# Patient Record
Sex: MALE | Race: WHITE | NOT HISPANIC OR LATINO | Employment: FULL TIME | URBAN - METROPOLITAN AREA
[De-identification: names, ages, dates, MRNs, and addresses within clinical notes are randomized per-mention and may not be internally consistent; named-entity substitution may affect disease eponyms.]

---

## 2017-04-04 ENCOUNTER — PATIENT MESSAGE (OUTPATIENT)
Dept: OTOLARYNGOLOGY | Facility: CLINIC | Age: 22
End: 2017-04-04

## 2017-04-04 ENCOUNTER — TELEPHONE (OUTPATIENT)
Dept: OTOLARYNGOLOGY | Facility: CLINIC | Age: 22
End: 2017-04-04

## 2017-04-04 NOTE — TELEPHONE ENCOUNTER
----- Message from Sparkle Koo sent at 4/4/2017  2:49 PM CDT -----  Contact: my chart  Appointment Request From: Dayne Sanders         With Provider: Other - (see comments) [oth]        Would Accept With:Only the person I've selected        Preferred Date Range: From 4/2/2017 To 4/30/2017        Preferred Times: Any        Reason for visit: Request an Appt        Comments:    Dr. Eros Bauman

## 2017-04-19 ENCOUNTER — OFFICE VISIT (OUTPATIENT)
Dept: OTOLARYNGOLOGY | Facility: CLINIC | Age: 22
End: 2017-04-19
Payer: COMMERCIAL

## 2017-04-19 VITALS
BODY MASS INDEX: 24.38 KG/M2 | HEART RATE: 77 BPM | TEMPERATURE: 98 F | SYSTOLIC BLOOD PRESSURE: 151 MMHG | WEIGHT: 155.63 LBS | DIASTOLIC BLOOD PRESSURE: 70 MMHG

## 2017-04-19 DIAGNOSIS — J34.89 NASAL OBSTRUCTION: ICD-10-CM

## 2017-04-19 DIAGNOSIS — G47.30 SLEEP DISORDER BREATHING: ICD-10-CM

## 2017-04-19 DIAGNOSIS — J35.1 CHRONIC TONSILLAR HYPERTROPHY: Primary | ICD-10-CM

## 2017-04-19 PROCEDURE — 99203 OFFICE O/P NEW LOW 30 MIN: CPT | Mod: 25,S$GLB,, | Performed by: OTOLARYNGOLOGY

## 2017-04-19 PROCEDURE — 31575 DIAGNOSTIC LARYNGOSCOPY: CPT | Mod: S$GLB,,, | Performed by: OTOLARYNGOLOGY

## 2017-04-19 PROCEDURE — 1160F RVW MEDS BY RX/DR IN RCRD: CPT | Mod: S$GLB,,, | Performed by: OTOLARYNGOLOGY

## 2017-04-19 PROCEDURE — 99999 PR PBB SHADOW E&M-EST. PATIENT-LVL III: CPT | Mod: PBBFAC,,, | Performed by: OTOLARYNGOLOGY

## 2017-04-19 NOTE — MR AVS SNAPSHOT
[facility]  [address]  [facility] [address]  Phone: [phone]  Fax: [phone]                  Dayne Sanders   2017 4:00 PM   Office Visit    Description:  Male : 1995   Provider:  Eros Bauman MD   Department:  [facility]           Reason for Visit     unable to breath thru nose/ tonsils swollen           Diagnoses this Visit        Comments    Chronic tonsillar hypertrophy    -  Primary     Nasal obstruction         Sleep disorder breathing                To Do List           Goals (5 Years of Data)     None      Follow-Up and Disposition     Return in about 3 months (around 2017).      [facility] On Call     [facility] On Call Nurse Care Line -  Assistance  Unless otherwise directed by your provider, please contact [facility] On-Call, our nurse care line that is available for  assistance.     Registered nurses in the [facility] On Call Center provide: appointment scheduling, clinical advisement, health education, and other advisory services.  Call: [phone] (toll free)               Medications           Message regarding Medications     Verify the changes and/or additions to your medication regime listed below are the same as discussed with your clinician today.  If any of these changes or additions are incorrect, please notify your healthcare provider.             Verify that the below list of medications is an accurate representation of the medications you are currently taking.  If none reported, the list may be blank. If incorrect, please contact your healthcare provider. Carry this list with you in case of emergency.           Current Medications     azithromycin (ZITHROMAX) 500 MG tablet Take 2 tabs today and 1 tab daily for the next 4 days    ibuprofen (ADVIL,MOTRIN) 200 MG tablet Take 200 mg by mouth every 6 (six) hours as needed for Pain (2 tabs).    LORATADINE/PSEUDOEPHEDRINE (CLARITIN-D 24 HOUR  ORAL) Take 1 capsule by mouth once daily.           Clinical Reference Information           Your Vitals Were     BP Pulse Temp Weight BMI    151/70 77 97.9 °F (36.6 °C) 70.6 kg (155 lb 10.3 oz) 24.38 kg/m2      Blood Pressure          Most Recent Value    BP  (!)  151/70      Allergies as of 4/19/2017     No Known Allergies      Immunizations Administered on Date of Encounter - 4/19/2017     None      Instructions    Use Flonase nasal spray  Use nasal saline spray  Use Allegra as needed  Consider tonsillectomy +/- adenoidectomy      Adult Tonsillectomy  The tonsils are 2 small masses of tissue at the back of the throat. They are part of the bodys immune system. This helps the body fight disease. In some people, the tonsils become infected or enlarged. This can cause severe sore throats, snoring, or other problems. Tonsillectomy is surgery to remove the tonsils. Tonsillectomy may be recommended if you have obstruction causing sleep apnea, or recurring, chronic, or severe infections. This sheet tells you more about this surgery and what to expect.    Preparing for surgery  Prepare as you have been told. Tell your healthcare provider about all medicine you take. This includes over-the-counter drugs. It also includes herbs and other supplements. You may need to stop taking some or all of them before surgery as directed by your healthcare provider. Also, follow any directions youre given for not eating or drinking before surgery.  The day of surgery  The surgery takes about 60 minutes. You will likely go home on the same day.  Before the surgery  Here is what to expect before the surgery begins:  · An IV line is put into a vein in your arm or hand. This line supplies fluids and medicines.  · To keep you free of pain during the surgery, youre given general anesthesia. This medicine puts you into a state like deep sleep through the surgery.  During the surgery  Here is what to expect during the surgery:  · A special  device is used to keep the mouth open.  · Other tools are used to remove the tonsils from the back of the throat. The tissue is taken out through the mouth.  · The device holding the mouth open is then removed.  After the surgery  You will be taken to a recovery room. Healthcare staff will make sure you can drink some liquids. They will also make sure your pain is being managed. When you are ready to leave the hospital, you will need to be driven home by an adult family member or friend.  Recovering at home  It will likely take about 2 weeks to heal from the surgery. During your recovery:  · Expect to have throat pain. You may also feel pain in your ears. This is referred pain from the throat, and is normal. Your post-surgery pain may come and go. It may be worse on the 4th or 5th day after surgery.  · Talk as little as possible, if it is painful.  · Take pain medicine as directed.  · Do not drive while you are on opioid or narcotic pain medicine. Expect to feel sleepy or dizzy while you are taking this medicine.  · Do not use ibuprofen or aspirin for 14 days after surgery unless your healthcare provider says its OK. You may use acetaminophen as directed.  · Use 2 or 3 pillows under your head while resting. This will help keep swelling down.  · Drink lots of chilled liquids. Water, noncitrus juices, and frozen juice bars are good choices.  · Eat cold foods and soft foods, which are easiest to swallow. Try foods like ice cream, gelatin, scrambled eggs, pasta, and mashed potatoes.  · Avoid foods that require a lot of chewing. Also avoid foods that may scratch the throat, like toast or potato chips. Avoid hot, spicy, or acidic foods.  · Avoid strenuous activity for 2 to 3 weeks after surgery.  · Be aware that white patches will form in the throat during healing. These are scabs and are not a sign of infection. The patches will come off in 1 to 2 weeks and may cause bleeding. To minimize bleeding, drink lots of  fluids. Gargling with cold water can help.  Call the healthcare provider  Call your healthcare provider if you have any of the following:  · Chest pain or trouble breathing (call 911)  · Fever of 100.4°F (38°C) or higher, or as directed by your healthcare provider  · Bright red bleeding from the mouth or nose  · Severe pain not relieved by medicine  · Signs of dehydration (dark urine, urinating less often)  · Heavy or persistent bleeding in the throat at any time  · Other signs or symptoms as indicated by your healthcare provider   Follow-up  Schedule a follow-up visit with your healthcare provider as advised. During this visit, the healthcare provider will make sure you are healing well. Ask any questions you have about the surgery or your recovery.  Risks and possible complications   Risks of this surgery include:  · Infection  · Bleeding  · Injury to the lips or teeth  · Painful swallowing during recovery  · The need for a second surgery  · Risks of anesthesia    Date Last Reviewed: 6/11/2015  © 0166-8719 [employer]. [address] [place] [address]. All rights reserved. This information is not intended as a substitute for professional medical care. Always follow your healthcare professional's instructions.        Tonsillectomy/Adenoidectomy  Your child may be having surgery to remove the tonsils or adenoids. If required, the tonsils and adenoids can be removed during the same surgery. The 2 procedures are described below.    Tonsillectomy  Tonsillectomy is surgery to remove the tonsils. The tonsils are two small masses of tissue that help the body fight disease. They are located in the back of the throat, behind and above the tongue. The tonsils are easily seen. Tonsillectomy may be performed if enlarged tonsils make it hard to breathe, or if the tonsils are often infected.  Adenoidectomy  Adenoidectomy is surgery to remove the adenoids. The word adenoids refers to a single mass of  tissue that helps the body fight disease. This mass is located behind the nose and upper throat, near the passage to the middle ear (eustachian tube). It is hidden from view by the soft palate. Adenoidectomy may be needed if enlarged adenoid tissue obstructs breathing. It may also be done if infected adenoid tissue is causing ear infections.  Removal of the tonsils and adenoids is one of the most common surgical procedures. Although the tonsils and adenoids help to fight infections, the body's ability to fight infection is not negatively affected when the tonsils and adenoids are removed.  Date Last Reviewed: 9/8/2014 © 2000-2016 [employer]. [address] [place] [address], [place] [address]. All rights reserved. This information is not intended as a substitute for professional medical care. Always follow your healthcare professional's instructions.        After Tonsillectomy/Adenoidectomy     Drinking plenty of fluids will help your child recover.   Your child has had surgery to remove tonsils and/or adenoids. Your child will need time to get better. Below are guidelines for your childs recovery.  Pain and Activity  · Expect your child to have some throat or ear pain for 1-2 weeks.  · Limit activity for 1-2 weeks or as advised.  Diet  Make sure your child gets enough fluids and nutrients. Food and drink guidelines include:  · Give lots of fluids. Good choices are water, popsicles, and mild juices. (Do not give citrus juice or other acidic juices.)  · Give soft foods to eat. These include gelatin, pudding, ice cream, scrambled eggs, pasta, and mashed foods.  · Do not give spicy, acidic, or rough foods. These include fresh fruits, toast, crackers, and potato chips.  Medication  Give only medications approved by your childs health care provider. Follow directions closely when giving your child medications.  · Your child may be prescribed pain medication.  · Do not give your child ibuprofen or aspirin. They  may cause bleeding. If needed for discomfort, you can give your child acetaminophen instead.  When to Call Your Child's Health Care Provider  Mild pain and a slight fever are normal after surgery. The surgical site will turn whitish while it is healing. This is normal and not an infection. But call your child's health care provider right away if your otherwise healthy child has any of the following:  · Persistent fever:  ¨ In a child 3 to 36 months, a rectal temperature of 102°F (39.0°C) or higher  ¨ In a child of any age who has a temperature of 103°F (39.4°C) or higher  ¨ A fever that lasts more than 24-hours in a child under 2 years old, or for 3 days in a child 2 years or older  ¨ Your child has had a seizure caused by the fever  · Severe pain not relieved by medication  · Bright red bleeding, which includes fast bleeding, spitting, or coughing up a large clot, or blood-tinged spit that continues  · Trouble breathing   Date Last Reviewed: 9/8/2014  © 6564-8799 [employer]. [address] [place] [address]. All rights reserved. This information is not intended as a substitute for professional medical care. Always follow your healthcare professional's instructions.             Language Assistance Services     ATTENTION: Language assistance services are available, free of charge. Please call [phone].      ATENCIÓN: Si habla mik, tiene a jay disposición servicios gratuitos de asistencia lingüística. Llame al [phone].     GLORIA Ý: N?u b?n nói Ti?ng Vi?t, có các d?ch v? h? tr? ngôn ng? mi?n phí dành cho b?n. G?i s? [phone].         [facility] complies with applicable Federal civil rights laws and does not discriminate on the basis of race, color, national origin, age, disability, or sex.

## 2017-04-19 NOTE — PROGRESS NOTES
"[facility]  Department of Otorhinolaryngology and Communication Sciences    Dayne Sanders is a 21 y.o. male who presents to the [facility] self-referred for further management of difficulty breathing through his nose and frequent strep throat.     He complains of constant discomfort in his throat. Onset was gradual. Duration is at least 8 years. Time course is constant.  Last time he had strep was about a year ago.  Symptoms are worsening. He denies any exacerbating factors. He denies any alleviating factors. Associated symptoms include needing to clear his throat when he is speaking.  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 He has been "sick" 3-4 times within the last year. Symptoms include a mild headache, worsening of his sore throat, clear rhinorrhea, fatigue, photophobia. He usually seeks care from an urgent care. He receives antibiotics, which usually do begin helping within the first few days. He has not required hospitalization or ED visits, nor has he had any oropharyngeal or cervical abscesses. He has a history of Mono 5 years ago. He reports regular loud snoring per his girlfriend, who also does endorse witnessed apneas on a fairly consistent basis, in addition to nighttime mouth breathing. He reports constant postnasal drip which can be difficult to manage while singing. In the past he has not found any relief with OTC remedies. He has used Nasonex in the past, without any significant benefit. Claritin D. He reports anosmia and mild hypogeusia. He denies any other atopic symptoms. However, he notes exacerbation of his sinonasal symptoms in the winter and spring. He reports that he had asthma as a child, but he does not suspect he has asthma anymore. He does not have any known NSAID sensitivity.    He is currently working in real estate.  He plays several instruments, including guitar. 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       Past Medical History  He has a past medical history of Mononucleosis.     Past Surgical History  He has a past surgical history that includes mole removeal (2002).    Family History  His family history includes Diabetes in his maternal grandmother; Heart disease in his brother and paternal grandfather; Hypertension in his paternal grandfather; Macular degeneration in his paternal grandfather; No Known Problems in his father and mother.    Social History  He reports that he quit smoking about 2 years ago. His smoking use included Cigarettes. He does not have any smokeless tobacco history on file. He reports that he does not use illicit drugs.    Allergies  He has No Known Allergies.    Medications  He has a current medication list which includes the following prescription(s): azithromycin, ibuprofen, and loratadine/pseudoephedrine.    Review of Systems   Constitutional: Negative for fever.   HENT: Negative for sore throat.    Eyes: Negative for visual disturbance.   Respiratory: Negative for wheezing.    Cardiovascular: Negative for chest pain.   Gastrointestinal: Negative for nausea.   Musculoskeletal: Negative for arthralgias.   Skin: Negative for rash.   Neurological: Negative for tremors.   Hematological: Does not bruise/bleed easily.   Psychiatric/Behavioral: The patient is not nervous/anxious.           Objective:     BP (!) 151/70  Pulse 77  Temp 97.9 °F (36.6 °C)  Wt 70.6 kg (155 lb 10.3 oz)  BMI 24.38 kg/m2   Physical Exam    Constitutional: comfortable, well dressed  Psychiatric: appropriate affect  Respiratory: comfortably breathing, symmetric chest rise, no stridor  Voice: normal for age and gender  Cardiovascular: upper extremities non-edematous  Lymphatic: no cervical lymphadenopathy  Neurologic: alert and oriented to time, place, person, and situation; cranial nerves 3-12 grossly intact  Head: normocephalic  Eyes: conjunctivae and sclerae clear  Ears: normal pinnae, normal external  auditory canals, tympanic membranes intact  Nose: mucosa pink;, no masses, no mucopurulence, no polyps  Oral cavity / oropharynx: no mucosal lesions; 3+ tonsils, no exudates  Neck: soft, full range of motion, laryngotracheal complex palpable with appropriate landmarks, larynx elevates on swallowing  Indirect laryngoscopy: limited due to gag    Procedure  Flexible Laryngoscopy (07456): Laryngoscopy is indicated for assessment of upper aerodigestive structure and function. This was carried out transnasally with a distal chip videoendoscope. After verbal consent was obtained, the patient was positioned and the nose was topically decongested with 1% phenylephrine and topically anesthetized with 4% lidocaine. The endoscope was passed through the most patent nasal cavity and positioned to image the nasopharynx, larynx, and hypopharynx in detail. The following featured were examined: nasopharyngeal, laryngeal, hypopharyngeal masses; velopharyngeal strength, closure, and symmetry of motion; vocal fold range and symmetry of motion; laryngeal mucosal edema, erythema, inflammation, and hydration; salivary pooling; and gross laryngeal sensation. The equipment was removed. The patient tolerated the procedure well without complication. All findings were normal except:  - mild adenoid hypertrophy  - mild turbinate hypertrophy  - mild diffuse posterior pharyngeal cobblestoning      Assessment:     Dayne Sanders is a 21 y.o. male with chronic tonsillar hypertrophy, chronic sore throats, recurrent acute pharyngotonsillitis, obstructive sleep disordered breathing, nasal obstruction, inferior nasal turbinate hypertrophy       Plan:        I had a discussion with the patient regarding his condition and the further workup and management options.      He does meet indications for tonsillectomy. I discussed the risks, benefits and alternatives to surgery with the patient, as well as the expected postoperative course. Risks included  but were not limited to pain; bleeding; infection; scarring; voice/speech change; swallowing disturbance; nasal regurgitation; recurrence; need for additional and/or more extensive procedures; continued sore throats; continued halitosis; oral/dental problems (pain, laceration, broken or missing teeth); jaw joint problems (pain, dislocation); and tongue problems (pain, laceration, numbness, weakness, taste disturbance). Also inherent in the procedure are the risks of general anesthesia, including but not limited to cardiovascular complications (heart attack, arrhythmia); pulmonary (respiratory failure); neurologic (stroke); and death.     He ultimately may also benefit from evaluation by allergy/immunology and/or Dr. Sahu.    At this time, he defers on any surgical intervention. I recommended regular use of nasal saline spray and a topical nasal steroid, and use of fexofenadine PRN.    He will follow up with me in 3 month(s), or sooner if needed.    All questions were answered, and the patient is in agreement with the above.     Eros Bauman M.D.  [facility]  Department of Otorhinolaryngology and Communication Sciences

## 2017-04-19 NOTE — PATIENT INSTRUCTIONS
Use Flonase nasal spray  Use nasal saline spray  Use Allegra as needed  Consider tonsillectomy +/- adenoidectomy      Adult Tonsillectomy  The tonsils are 2 small masses of tissue at the back of the throat. They are part of the bodys immune system. This helps the body fight disease. In some people, the tonsils become infected or enlarged. This can cause severe sore throats, snoring, or other problems. Tonsillectomy is surgery to remove the tonsils. Tonsillectomy may be recommended if you have obstruction causing sleep apnea, or recurring, chronic, or severe infections. This sheet tells you more about this surgery and what to expect.    Preparing for surgery  Prepare as you have been told. Tell your healthcare provider about all medicine you take. This includes over-the-counter drugs. It also includes herbs and other supplements. You may need to stop taking some or all of them before surgery as directed by your healthcare provider. Also, follow any directions youre given for not eating or drinking before surgery.  The day of surgery  The surgery takes about 60 minutes. You will likely go home on the same day.  Before the surgery  Here is what to expect before the surgery begins:  · An IV line is put into a vein in your arm or hand. This line supplies fluids and medicines.  · To keep you free of pain during the surgery, youre given general anesthesia. This medicine puts you into a state like deep sleep through the surgery.  During the surgery  Here is what to expect during the surgery:  · A special device is used to keep the mouth open.  · Other tools are used to remove the tonsils from the back of the throat. The tissue is taken out through the mouth.  · The device holding the mouth open is then removed.  After the surgery  You will be taken to a recovery room. Healthcare staff will make sure you can drink some liquids. They will also make sure your pain is being managed. When you are ready to leave the hospital,  you will need to be driven home by an adult family member or friend.  Recovering at home  It will likely take about 2 weeks to heal from the surgery. During your recovery:  · Expect to have throat pain. You may also feel pain in your ears. This is referred pain from the throat, and is normal. Your post-surgery pain may come and go. It may be worse on the 4th or 5th day after surgery.  · Talk as little as possible, if it is painful.  · Take pain medicine as directed.  · Do not drive while you are on opioid or narcotic pain medicine. Expect to feel sleepy or dizzy while you are taking this medicine.  · Do not use ibuprofen or aspirin for 14 days after surgery unless your healthcare provider says its OK. You may use acetaminophen as directed.  · Use 2 or 3 pillows under your head while resting. This will help keep swelling down.  · Drink lots of chilled liquids. Water, noncitrus juices, and frozen juice bars are good choices.  · Eat cold foods and soft foods, which are easiest to swallow. Try foods like ice cream, gelatin, scrambled eggs, pasta, and mashed potatoes.  · Avoid foods that require a lot of chewing. Also avoid foods that may scratch the throat, like toast or potato chips. Avoid hot, spicy, or acidic foods.  · Avoid strenuous activity for 2 to 3 weeks after surgery.  · Be aware that white patches will form in the throat during healing. These are scabs and are not a sign of infection. The patches will come off in 1 to 2 weeks and may cause bleeding. To minimize bleeding, drink lots of fluids. Gargling with cold water can help.  Call the healthcare provider  Call your healthcare provider if you have any of the following:  · Chest pain or trouble breathing (call 911)  · Fever of 100.4°F (38°C) or higher, or as directed by your healthcare provider  · Bright red bleeding from the mouth or nose  · Severe pain not relieved by medicine  · Signs of dehydration (dark urine, urinating less often)  · Heavy or  persistent bleeding in the throat at any time  · Other signs or symptoms as indicated by your healthcare provider   Follow-up  Schedule a follow-up visit with your healthcare provider as advised. During this visit, the healthcare provider will make sure you are healing well. Ask any questions you have about the surgery or your recovery.  Risks and possible complications   Risks of this surgery include:  · Infection  · Bleeding  · Injury to the lips or teeth  · Painful swallowing during recovery  · The need for a second surgery  · Risks of anesthesia    Date Last Reviewed: 6/11/2015 © 2000-2016 [employer]. [address] [place] [address]. All rights reserved. This information is not intended as a substitute for professional medical care. Always follow your healthcare professional's instructions.        Tonsillectomy/Adenoidectomy  Your child may be having surgery to remove the tonsils or adenoids. If required, the tonsils and adenoids can be removed during the same surgery. The 2 procedures are described below.    Tonsillectomy  Tonsillectomy is surgery to remove the tonsils. The tonsils are two small masses of tissue that help the body fight disease. They are located in the back of the throat, behind and above the tongue. The tonsils are easily seen. Tonsillectomy may be performed if enlarged tonsils make it hard to breathe, or if the tonsils are often infected.  Adenoidectomy  Adenoidectomy is surgery to remove the adenoids. The word adenoids refers to a single mass of tissue that helps the body fight disease. This mass is located behind the nose and upper throat, near the passage to the middle ear (eustachian tube). It is hidden from view by the soft palate. Adenoidectomy may be needed if enlarged adenoid tissue obstructs breathing. It may also be done if infected adenoid tissue is causing ear infections.  Removal of the tonsils and adenoids is one of the most common surgical  procedures. Although the tonsils and adenoids help to fight infections, the body's ability to fight infection is not negatively affected when the tonsils and adenoids are removed.  Date Last Reviewed: 9/8/2014 © 2000-2016 [employer]. [address] [place] [address], [place] [address]. All rights reserved. This information is not intended as a substitute for professional medical care. Always follow your healthcare professional's instructions.        After Tonsillectomy/Adenoidectomy     Drinking plenty of fluids will help your child recover.   Your child has had surgery to remove tonsils and/or adenoids. Your child will need time to get better. Below are guidelines for your childs recovery.  Pain and Activity  · Expect your child to have some throat or ear pain for 1-2 weeks.  · Limit activity for 1-2 weeks or as advised.  Diet  Make sure your child gets enough fluids and nutrients. Food and drink guidelines include:  · Give lots of fluids. Good choices are water, popsicles, and mild juices. (Do not give citrus juice or other acidic juices.)  · Give soft foods to eat. These include gelatin, pudding, ice cream, scrambled eggs, pasta, and mashed foods.  · Do not give spicy, acidic, or rough foods. These include fresh fruits, toast, crackers, and potato chips.  Medication  Give only medications approved by your childs health care provider. Follow directions closely when giving your child medications.  · Your child may be prescribed pain medication.  · Do not give your child ibuprofen or aspirin. They may cause bleeding. If needed for discomfort, you can give your child acetaminophen instead.  When to Call Your Child's Health Care Provider  Mild pain and a slight fever are normal after surgery. The surgical site will turn whitish while it is healing. This is normal and not an infection. But call your child's health care provider right away if your otherwise healthy child has any of the following:  · Persistent  fever:  ¨ In a child 3 to 36 months, a rectal temperature of 102°F (39.0°C) or higher  ¨ In a child of any age who has a temperature of 103°F (39.4°C) or higher  ¨ A fever that lasts more than 24-hours in a child under 2 years old, or for 3 days in a child 2 years or older  ¨ Your child has had a seizure caused by the fever  · Severe pain not relieved by medication  · Bright red bleeding, which includes fast bleeding, spitting, or coughing up a large clot, or blood-tinged spit that continues  · Trouble breathing   Date Last Reviewed: 9/8/2014  © 1488-7404 [employer]. [address] [place] [address], [place] [address]. All rights reserved. This information is not intended as a substitute for professional medical care. Always follow your healthcare professional's instructions.

## 2017-08-17 ENCOUNTER — OFFICE VISIT (OUTPATIENT)
Dept: URGENT CARE | Facility: CLINIC | Age: 22
End: 2017-08-17
Payer: COMMERCIAL

## 2017-08-17 VITALS
OXYGEN SATURATION: 98 % | WEIGHT: 160 LBS | HEART RATE: 81 BPM | DIASTOLIC BLOOD PRESSURE: 78 MMHG | BODY MASS INDEX: 22.9 KG/M2 | TEMPERATURE: 99 F | SYSTOLIC BLOOD PRESSURE: 134 MMHG | HEIGHT: 70 IN | RESPIRATION RATE: 18 BRPM

## 2017-08-17 DIAGNOSIS — K52.9 GASTROENTERITIS: Primary | ICD-10-CM

## 2017-08-17 PROCEDURE — 99203 OFFICE O/P NEW LOW 30 MIN: CPT | Mod: S$GLB,,, | Performed by: NURSE PRACTITIONER

## 2017-08-17 PROCEDURE — 3008F BODY MASS INDEX DOCD: CPT | Mod: S$GLB,,, | Performed by: NURSE PRACTITIONER

## 2017-08-17 RX ORDER — FLUTICASONE PROPIONATE 50 MCG
1 SPRAY, SUSPENSION (ML) NASAL DAILY
COMMUNITY
End: 2018-07-20

## 2017-08-17 RX ORDER — ONDANSETRON 4 MG/1
TABLET, ORALLY DISINTEGRATING ORAL
Qty: 10 TABLET | Refills: 0 | Status: SHIPPED | OUTPATIENT
Start: 2017-08-17 | End: 2018-07-20

## 2017-08-17 NOTE — PATIENT INSTRUCTIONS
Noninfectious Gastroenteritis (Ages 6 Years to Adult)    Gastroenteritis can cause nausea, vomiting, diarrhea, and abdominal cramping. This may occur as a result of food sensitivity, inflammation of your gastrointestinal tract, medicines, stress, or other causes not related to infection. Your symptoms will usually last from 1 to 3 days, but can last longer. Antibiotics are not effective, but simple home treatment will be helpful.  Home care  Medicine  · You may use acetaminophen or NSAID medicines like ibuprofen or naproxen to control fever, unless another medicine is prescribed. (Note: If you have chronic liver or kidney disease, or ever had a stomach ulcer or gastrointestinalI bleeding, talk with your healthcare provider before using these medicines.) Aspirin should never be used in anyone under 18 years of age who is ill with a fever. It may cause severe liver damage. Don't increase your NSAID medicines if you are already taking these medicines for another condition (like arthritis). Don't use NSAIDS if you are on aspirin (such as for heart disease, or after a stroke).  · If medicines for diarrhea or vomiting are prescribed, take only as directed.  General care and preventing spread of the illness  · If symptoms are severe, rest at home for the next 24 hours or until you feel better.  · Hand washing with soap and water is the best way to prevent the spread of infection. Wash your hands after touching anyone who is sick.  · Wash your hands after using the toilet and before meals. Clean the toilet after each use.  · Caffeine, tobacco, and alcohol can make your diarrhea, cramping, and pain worse.  Diet  · Water and clear liquids are important so you do not get dehydrated. Drink a small amount at a time.  · Do not force yourself to eat, especially if you have cramps, vomiting, or diarrhea. When you finally decide to start eating, do not eat large amounts at a time, even if you are hungry.  · If you eat, avoid  fatty, greasy, spicy, or fried foods.  · Do not eat dairy products if you have diarrhea; they can make the diarrhea worse.  During the first 24 hours (the first full day), follow the diet below:  · Beverages: Water, clear liquids, soft drinks without caffeine, like ginger ale; mineral water (plain or flavored); decaffeinated tea and coffee.  · Soups: Clear broth, consommé, and bouillon Sports drinks aren't a good choice because they have too much sugar and not enough electrolytes. In this case, commercially available products called oral rehydration solutions are best.  · Desserts: Plain gelatin, popsicles, and fruit juice bars.  During the next 24 hours (the second day), you may add the following to the above if you have improved. If not, continue what you did the first day:  · Hot cereal, plain toast, bread, rolls, crackers  · Plain noodles, rice, mashed potatoes, chicken noodle or rice soup  · Unsweetened canned fruit (avoid pineapple), bananas  · Limit caffeine and chocolate. No spices or seasonings except salt.  During the next 24 hours  · Gradually resume a normal diet, as you feel better and your symptoms improve.  · If at any time your symptoms start getting worse, go back to clear liquids until you feel better.  Food preparation  · If you have diarrhea, you should not prepare food for others. When you  prepare food for yourself, wash your hands before and after.  · Wash your hands after using cutting boards, countertops, and knives that have been in contact with raw food.  · Keep uncooked meats away from cooked and ready-to-eat foods.  Follow-up care  Follow up with your healthcare provider if you are not improving over the next 2 to 3 days, or as advised. If a stool (diarrhea) sample was taken, call for the results as directed.  When to seek medical care  Call your healthcare provider right away if any of these occur:   · Increasing abdominal pain or constant lower right abdominal pain  · Continued  vomiting (unable to keep liquids down)  · Frequent diarrhea (more than 5 times a day)  · Blood in vomit or stool (black or red color)  · Inability to tolerate solid food after a few days.  · Dark urine, reduced urine output  · Weakness, dizziness  · Drowsiness  · Fever of 100.4ºF (38.0ºC) or higher, or as directed by your healthcare provider  · New rash  Call 911  Call 911 if any of these occur:  · Trouble breathing  · Chest pain  · Confusion  · Severe drowsiness or trouble awakening  · Seizure  · Stiff neck  Date Last Reviewed: 11/16/2015  © 5545-8853 [employer]. [address] [place] [address], [place] [address]. All rights reserved. This information is not intended as a substitute for professional medical care. Always follow your healthcare professional's instructions.

## 2017-08-17 NOTE — PROGRESS NOTES
"Subjective:       Patient ID: Dayne Sanders is a 22 y.o. male.    Vitals:  height is 5' 10" (1.778 m) and weight is 72.6 kg (160 lb). His temperature is 98.5 °F (36.9 °C). His blood pressure is 134/78 and his pulse is 81. His respiration is 18 and oxygen saturation is 98%.     Chief Complaint: Diarrhea    C/o N/V x1 and diarrhea watery 5-10 yesterday with only 2 episodes of watery diarrhea today.  Immodium taken with some relief.  No fever, chills, body aches.  No abdominal pain.  No one else ill.  Has not eaten anything strange or different from his girlfriend.  No mucus to stool.  No recent travel or antibiotic use.  Feels almost better but needs note for work.      Diarrhea    This is a new problem. The current episode started yesterday. The problem occurs 5 to 10 times per day. The problem has been rapidly improving. The stool consistency is described as watery. The patient states that diarrhea does not awaken him from sleep. Associated symptoms include vomiting. Pertinent negatives include no abdominal pain, bloating, chills, fever, URI or weight loss. Nothing aggravates the symptoms. He has tried anti-motility drug for the symptoms. The treatment provided mild relief. There is no history of bowel resection, inflammatory bowel disease, irritable bowel syndrome, malabsorption, a recent abdominal surgery or short gut syndrome.     Review of Systems   Constitution: Negative for chills, fever, weakness, malaise/fatigue and weight loss.   Cardiovascular: Negative for chest pain.   Respiratory: Negative for shortness of breath.    Skin: Negative for rash.   Musculoskeletal: Negative for back pain.   Gastrointestinal: Positive for diarrhea (only 2 episodes of watery diarrhea today.), nausea and vomiting. Negative for bloating, abdominal pain, constipation, hematemesis, hematochezia and melena.   Genitourinary: Negative for dysuria.   Allergic/Immunologic: Negative for persistent infections.       Objective:    "   Physical Exam   Constitutional: He is oriented to person, place, and time. He appears well-developed and well-nourished.  Non-toxic appearance. He does not have a sickly appearance. He does not appear ill. No distress.   HENT:   Head: Normocephalic and atraumatic.   Right Ear: External ear normal.   Left Ear: External ear normal.   Nose: Nose normal.   Mouth/Throat: Uvula is midline, oropharynx is clear and moist and mucous membranes are normal.   Eyes: Conjunctivae and lids are normal.   Neck: Trachea normal and full passive range of motion without pain. Neck supple.   Cardiovascular: Normal rate, regular rhythm and normal heart sounds.    Pulmonary/Chest: Effort normal and breath sounds normal. No respiratory distress.   Abdominal: Soft. Normal appearance. He exhibits no distension, no abdominal bruit, no pulsatile midline mass and no mass. Bowel sounds are increased. There is no tenderness.   Musculoskeletal: Normal range of motion. He exhibits no edema.   Neurological: He is alert and oriented to person, place, and time. He has normal strength.   Skin: Skin is warm, dry and intact. He is not diaphoretic. No pallor.   Psychiatric: He has a normal mood and affect. His speech is normal and behavior is normal. Judgment and thought content normal. Cognition and memory are normal.   Nursing note and vitals reviewed.      Assessment:       1. Gastroenteritis        Plan:         Gastroenteritis  -     ondansetron (ZOFRAN-ODT) 4 MG TbDL; One tablet sublingual tid prn nausea  Dispense: 10 tablet; Refill: 0    F/u PRN.  Go to ER for worsening of symptoms.  Increase water intake.

## 2017-08-17 NOTE — LETTER
August 17, 2017      [facility]  [address] [place] [address]  Phone: [phone]  Fax: [phone]       Patient: Dayne Sadners   YOB: 1995  Date of Visit: 08/17/2017    To Whom It May Concern:    SEBASTIAN Sanders  was at [facility] on 08/17/2017. He may return to work/school on 8-21-17 with no restrictions. If you have any questions or concerns, or if I can be of further assistance, please do not hesitate to contact me.    Sincerely,        Zenia Clark, NP

## 2018-07-20 ENCOUNTER — OFFICE VISIT (OUTPATIENT)
Dept: URGENT CARE | Facility: CLINIC | Age: 23
End: 2018-07-20
Payer: COMMERCIAL

## 2018-07-20 VITALS
HEIGHT: 70 IN | DIASTOLIC BLOOD PRESSURE: 78 MMHG | SYSTOLIC BLOOD PRESSURE: 123 MMHG | WEIGHT: 155 LBS | BODY MASS INDEX: 22.19 KG/M2 | RESPIRATION RATE: 18 BRPM | HEART RATE: 88 BPM | OXYGEN SATURATION: 97 % | TEMPERATURE: 101 F

## 2018-07-20 DIAGNOSIS — R50.9 FEVER, UNSPECIFIED FEVER CAUSE: ICD-10-CM

## 2018-07-20 DIAGNOSIS — R42 DIZZINESS: ICD-10-CM

## 2018-07-20 DIAGNOSIS — R11.2 NAUSEA AND VOMITING, INTRACTABILITY OF VOMITING NOT SPECIFIED, UNSPECIFIED VOMITING TYPE: ICD-10-CM

## 2018-07-20 DIAGNOSIS — L73.9 FOLLICULITIS: ICD-10-CM

## 2018-07-20 DIAGNOSIS — B34.9 VIRAL SYNDROME: Primary | ICD-10-CM

## 2018-07-20 LAB
CTP QC/QA: YES
FLUAV AG NPH QL: NEGATIVE
FLUBV AG NPH QL: NEGATIVE
GLUCOSE SERPL-MCNC: 80 MG/DL (ref 70–110)

## 2018-07-20 PROCEDURE — 3008F BODY MASS INDEX DOCD: CPT | Mod: CPTII,S$GLB,, | Performed by: NURSE PRACTITIONER

## 2018-07-20 PROCEDURE — 82948 REAGENT STRIP/BLOOD GLUCOSE: CPT | Mod: S$GLB,,, | Performed by: NURSE PRACTITIONER

## 2018-07-20 PROCEDURE — 87804 INFLUENZA ASSAY W/OPTIC: CPT | Mod: QW,S$GLB,, | Performed by: NURSE PRACTITIONER

## 2018-07-20 PROCEDURE — 99214 OFFICE O/P EST MOD 30 MIN: CPT | Mod: S$GLB,,, | Performed by: NURSE PRACTITIONER

## 2018-07-20 RX ORDER — SULFAMETHOXAZOLE AND TRIMETHOPRIM 800; 160 MG/1; MG/1
1 TABLET ORAL 2 TIMES DAILY
Qty: 20 TABLET | Refills: 0 | Status: SHIPPED | OUTPATIENT
Start: 2018-07-20 | End: 2018-07-30

## 2018-07-20 RX ORDER — ONDANSETRON 8 MG/1
8 TABLET, ORALLY DISINTEGRATING ORAL
Status: COMPLETED | OUTPATIENT
Start: 2018-07-20 | End: 2018-07-20

## 2018-07-20 RX ORDER — MECLIZINE HYDROCHLORIDE 25 MG/1
25 TABLET ORAL
Status: COMPLETED | OUTPATIENT
Start: 2018-07-20 | End: 2018-07-20

## 2018-07-20 RX ORDER — ONDANSETRON 4 MG/1
4 TABLET, ORALLY DISINTEGRATING ORAL EVERY 8 HOURS PRN
Qty: 30 TABLET | Refills: 0 | Status: SHIPPED | OUTPATIENT
Start: 2018-07-20 | End: 2018-07-30

## 2018-07-20 RX ORDER — MECLIZINE HYDROCHLORIDE 25 MG/1
25 TABLET ORAL 3 TIMES DAILY PRN
Qty: 30 TABLET | Refills: 0 | Status: SHIPPED | OUTPATIENT
Start: 2018-07-20 | End: 2018-07-30

## 2018-07-20 RX ADMIN — MECLIZINE HYDROCHLORIDE 25 MG: 25 TABLET ORAL at 05:07

## 2018-07-20 RX ADMIN — ONDANSETRON 8 MG: 8 TABLET, ORALLY DISINTEGRATING ORAL at 05:07

## 2018-07-20 NOTE — LETTER
July 20, 2018      [facility]  [address] [place] [address]  Phone: [phone]  Fax: [phone]       Patient: Dayne Sanders   YOB: 1995  Date of Visit: 07/20/2018    To Whom It May Concern:    SEBASTIAN Sanders  was at [facility] on 07/20/2018. He may return to work/school on 7/23/2018 with no restrictions. If you have any questions or concerns, or if I can be of further assistance, please do not hesitate to contact me.    Sincerely,    Paty Giang NP

## 2018-07-20 NOTE — PROGRESS NOTES
"Subjective:       Patient ID: Dayne Sanders is a 23 y.o. male.    Vitals:  height is 5' 10" (1.778 m) and weight is 70.3 kg (155 lb). His oral temperature is 100.6 °F (38.1 °C) (abnormal). His blood pressure is 123/78 and his pulse is 88. His respiration is 18 and oxygen saturation is 97%.     Chief Complaint: Emesis    Patient started with headaches/dizziness/nauseated. Emesis x 2 today. Denies abdominal pain/tenderness. No change in bowel habits. Last meal was yesterday- had water today. Low grade fever in the clinic. Reports sweats/chills at home. Denies recent sinus congestion.      Emesis    This is a new problem. The current episode started today. The problem occurs less than 2 times per day. The problem has been unchanged. There has been no fever. Associated symptoms include dizziness and headaches. Pertinent negatives include no abdominal pain, arthralgias, chest pain, chills, coughing, diarrhea, fever, myalgias, sweats, URI or weight loss. He has tried nothing for the symptoms. The treatment provided no relief.     Review of Systems   Constitution: Negative for chills, fever and weight loss.   Cardiovascular: Negative for chest pain.   Respiratory: Negative for cough and shortness of breath.    Musculoskeletal: Negative for arthralgias, back pain and myalgias.   Gastrointestinal: Positive for nausea and vomiting. Negative for abdominal pain, constipation, diarrhea, hematochezia and melena.   Genitourinary: Negative for dysuria.   Neurological: Positive for dizziness and headaches.       Objective:      Physical Exam   Constitutional: He is oriented to person, place, and time. He appears well-developed and well-nourished.   HENT:   Head: Normocephalic and atraumatic.   Right Ear: External ear normal.   Left Ear: External ear normal.   Nose: Nose normal.   Mouth/Throat: Mucous membranes are normal. Posterior oropharyngeal edema and posterior oropharyngeal erythema (noted tonsilar craters bilaterally) " present.   Eyes: Conjunctivae and lids are normal.   Neck: Trachea normal and full passive range of motion without pain. Neck supple.   Cardiovascular: Normal rate, regular rhythm and normal heart sounds.    Pulses:       Radial pulses are 2+ on the right side, and 2+ on the left side.   Pulmonary/Chest: Effort normal and breath sounds normal. No respiratory distress. He has no decreased breath sounds. He has no wheezes. He has no rhonchi.   Abdominal: Soft. Normal appearance and bowel sounds are normal. He exhibits no distension, no abdominal bruit, no pulsatile midline mass and no mass. There is no tenderness.   Musculoskeletal: Normal range of motion. He exhibits no edema.   Neurological: He is alert and oriented to person, place, and time. He has normal strength.   Skin: Skin is warm, dry and intact. Lesion noted. He is not diaphoretic. There is erythema. No pallor.        approx 1/4cm pustule noted on right inner thigh with approx. 4cm erythema surrounding and mild induration/inflammation. Positive TTP   Psychiatric: He has a normal mood and affect. His speech is normal and behavior is normal. Judgment and thought content normal. Cognition and memory are normal.   Nursing note and vitals reviewed.      Results for orders placed or performed in visit on 07/20/18   POCT Influenza A/B   Result Value Ref Range    Rapid Influenza A Ag Negative Negative    Rapid Influenza B Ag Negative Negative     Acceptable Yes    POCT glucose   Result Value Ref Range    POC Glucose 80 70 - 110 MG/DL       Assessment:       1. Viral syndrome    2. Dizziness    3. Fever, unspecified fever cause    4. Nausea and vomiting, intractability of vomiting not specified, unspecified vomiting type    5. Folliculitis        Plan:     Advised patient that folliculitis/abscess will likely need I/D.Patient hesitant and encouraged once he was feeling better to follow up if folliculitis does not resolve with warm compresses and oral  antibiotics.     Viral syndrome    Dizziness  -     meclizine tablet 25 mg; Take 1 tablet (25 mg total) by mouth one time.  -     POCT glucose    Fever, unspecified fever cause  -     POCT Influenza A/B    Nausea and vomiting, intractability of vomiting not specified, unspecified vomiting type  -     ondansetron disintegrating tablet 8 mg; Take 1 tablet (8 mg total) by mouth one time.  -     POCT glucose    Folliculitis    Other orders  -     ondansetron (ZOFRAN-ODT) 4 MG TbDL; Take 1 tablet (4 mg total) by mouth every 8 (eight) hours as needed.  Dispense: 30 tablet; Refill: 0  -     meclizine (ANTIVERT) 25 mg tablet; Take 1 tablet (25 mg total) by mouth 3 (three) times daily as needed.  Dispense: 30 tablet; Refill: 0  -     sulfamethoxazole-trimethoprim 800-160mg (BACTRIM DS) 800-160 mg Tab; Take 1 tablet by mouth 2 (two) times daily. for 10 days  Dispense: 20 tablet; Refill: 0      Patient Instructions   Take rx antibiotic, bactrim, for full duration for the skin folliculitis.   Apply warm compresses 4-5 days/day.  Return if symptoms fail to improve for incision and drainage of the site.        Take rx miclizine for dizziness, zofran for nausea as directed and needed.  Take tylenol and/or advil for fever/headaches.    Push fluids like gatorade until eating full diet again.      Abscess (Antibiotic Treatment Only)  An abscess (sometimes called a boil) happens when bacteria get trapped under the skin and start to grow. Pus forms inside the abscess as the body responds to the bacteria. An abscess can happen with an insect bite, ingrown hair, blocked oil gland, pimple, cyst, or puncture wound.  In the early stages, your wound may be red and tender. For this stage, you may get antibiotics. If the abscess does not get better with antibiotics, it will need to be drained with a small cut.  Home care  These tips will help you care for your abscess at home:  · Soak the wound in hot water or apply hot packs (small towel  "soaked in hot water) to the area for 20 minutes at a time. Do this 3 to 4 times a day.  · Do not cut, squeeze, or pop the boil yourself.  · Apply antibiotic cream or ointment to the skin 3 to 4 times a day, unless something else was prescribed. Some ointments include an antibiotic plus a pain reliever.  · If your doctor prescribed antibiotics, do not stop taking them until you have finished the medicine or the doctor tells you to stop.  · You may use an over-the-counter pain medicine to control pain, unless another pain medicine was prescribed. If you have chronic liver or kidney disease or ever had a stomach ulcer or gastrointestinal bleeding, talk with your doctor before using these any of these.  Follow-up care  Follow up with your healthcare provider, or as advised. Check your wound each day for the signs of worsening infection listed below.  When to seek medical advice  Get prompt medical attention if any of these occur:  · An increase in redness or swelling  · Red streaks in the skin leading away from the abscess  · An increase in local pain or swelling  · Fever of 100.4ºF (38ºC) or higher, or as directed by your healthcare provider  · Pus or fluid coming from the abscess  · Boil returns after getting better  Date Last Reviewed: 9/1/2016  © 4724-1741 [employer]. [address] [place] [address], [place] [address]. All rights reserved. This information is not intended as a substitute for professional medical care. Always follow your healthcare professional's instructions.        Viral Syndrome (Adult)  A viral illness may cause a number of symptoms. The symptoms depend on the part of the body that the virus affects. If it settles in your nose, throat, and lungs, it may cause cough, sore throat, congestion, and sometimes headache. If it settles in your stomach and intestinal tract, it may cause vomiting and diarrhea. Sometimes it causes vague symptoms like "aching all over," feeling tired, loss of appetite, " or fever.  A viral illness usually lasts 1 to 2 weeks, but sometimes it lasts longer. In some cases, a more serious infection can look like a viral syndrome in the first few days of the illness. You may need another exam and additional tests to know the difference. Watch for the warning signs listed below.  Home care  Follow these guidelines for taking care of yourself at home:  · If symptoms are severe, rest at home for the first 2 to 3 days.  · Stay away from cigarette smoke - both your smoke and the smoke from others.  · You may use over-the-counter acetaminophen or ibuprofen for fever, muscle aching, and headache, unless another medicine was prescribed for this. If you have chronic liver or kidney disease or ever had a stomach ulcer or GI bleeding, talk with your doctor before using these medicines. No one who is younger than 18 and ill with a fever should take aspirin. It may cause severe disease or death.  · Your appetite may be poor, so a light diet is fine. Avoid dehydration by drinking 8 to 12 8-ounce glasses of fluids each day. This may include water; orange juice; lemonade; apple, grape, and cranberry juice; clear fruit drinks; electrolyte replacement and sports drinks; and decaffeinated teas and coffee. If you have been diagnosed with a kidney disease, ask your doctor how much and what types of fluids you should drink to prevent dehydration. If you have kidney disease, drinking too much fluid can cause it build up in the your body and be dangerous to your health.  · Over-the-counter remedies won't shorten the length of the illness but may be helpful for cough, sore throat; and nasal and sinus congestion. Don't use decongestants if you have high blood pressure.  Follow-up care  Follow up with your healthcare provider if you do not improve over the next week.  Call 911  Get emergency medical care if any of the following occur:  · Convulsion  · Feeling weak, dizzy, or like you are going to faint  · Chest  pain, shortness of breath, wheezing, or difficulty breathing  When to seek medical advice  Call your healthcare provider right away if any of these occur:  · Cough with lots of colored sputum (mucus) or blood in your sputum  · Chest pain, shortness of breath, wheezing, or difficulty breathing  · Severe headache; face, neck, or ear pain  · Severe, constant pain in the lower right side of your belly (abdominal)  · Continued vomiting (cant keep liquids down)  · Frequent diarrhea (more than 5 times a day); blood (red or black color) or mucus in diarrhea  · Feeling weak, dizzy, or like you are going to faint  · Extreme thirst  · Fever of 100.4°F (38°C) or higher, or as directed by your healthcare provider  Date Last Reviewed: 9/25/2015  © 1019-8256 [employer]. [address] [place] [address], [place] [address]. All rights reserved. This information is not intended as a substitute for professional medical care. Always follow your healthcare professional's instructions.

## 2018-07-20 NOTE — PATIENT INSTRUCTIONS
Take rx antibiotic, bactrim, for full duration for the skin folliculitis.   Apply warm compresses 4-5 days/day.  Return if symptoms fail to improve for incision and drainage of the site.        Take rx miclizine for dizziness, zofran for nausea as directed and needed.  Take tylenol and/or advil for fever/headaches.    Push fluids like gatorade until eating full diet again.      Abscess (Antibiotic Treatment Only)  An abscess (sometimes called a boil) happens when bacteria get trapped under the skin and start to grow. Pus forms inside the abscess as the body responds to the bacteria. An abscess can happen with an insect bite, ingrown hair, blocked oil gland, pimple, cyst, or puncture wound.  In the early stages, your wound may be red and tender. For this stage, you may get antibiotics. If the abscess does not get better with antibiotics, it will need to be drained with a small cut.  Home care  These tips will help you care for your abscess at home:  · Soak the wound in hot water or apply hot packs (small towel soaked in hot water) to the area for 20 minutes at a time. Do this 3 to 4 times a day.  · Do not cut, squeeze, or pop the boil yourself.  · Apply antibiotic cream or ointment to the skin 3 to 4 times a day, unless something else was prescribed. Some ointments include an antibiotic plus a pain reliever.  · If your doctor prescribed antibiotics, do not stop taking them until you have finished the medicine or the doctor tells you to stop.  · You may use an over-the-counter pain medicine to control pain, unless another pain medicine was prescribed. If you have chronic liver or kidney disease or ever had a stomach ulcer or gastrointestinal bleeding, talk with your doctor before using these any of these.  Follow-up care  Follow up with your healthcare provider, or as advised. Check your wound each day for the signs of worsening infection listed below.  When to seek medical advice  Get prompt medical attention if  "any of these occur:  · An increase in redness or swelling  · Red streaks in the skin leading away from the abscess  · An increase in local pain or swelling  · Fever of 100.4ºF (38ºC) or higher, or as directed by your healthcare provider  · Pus or fluid coming from the abscess  · Boil returns after getting better  Date Last Reviewed: 9/1/2016  © 7155-6966 [employer]. [address] [place] [address], [place] [address]. All rights reserved. This information is not intended as a substitute for professional medical care. Always follow your healthcare professional's instructions.        Viral Syndrome (Adult)  A viral illness may cause a number of symptoms. The symptoms depend on the part of the body that the virus affects. If it settles in your nose, throat, and lungs, it may cause cough, sore throat, congestion, and sometimes headache. If it settles in your stomach and intestinal tract, it may cause vomiting and diarrhea. Sometimes it causes vague symptoms like "aching all over," feeling tired, loss of appetite, or fever.  A viral illness usually lasts 1 to 2 weeks, but sometimes it lasts longer. In some cases, a more serious infection can look like a viral syndrome in the first few days of the illness. You may need another exam and additional tests to know the difference. Watch for the warning signs listed below.  Home care  Follow these guidelines for taking care of yourself at home:  · If symptoms are severe, rest at home for the first 2 to 3 days.  · Stay away from cigarette smoke - both your smoke and the smoke from others.  · You may use over-the-counter acetaminophen or ibuprofen for fever, muscle aching, and headache, unless another medicine was prescribed for this. If you have chronic liver or kidney disease or ever had a stomach ulcer or GI bleeding, talk with your doctor before using these medicines. No one who is younger than 18 and ill with a fever should take aspirin. It may cause severe disease or " death.  · Your appetite may be poor, so a light diet is fine. Avoid dehydration by drinking 8 to 12 8-ounce glasses of fluids each day. This may include water; orange juice; lemonade; apple, grape, and cranberry juice; clear fruit drinks; electrolyte replacement and sports drinks; and decaffeinated teas and coffee. If you have been diagnosed with a kidney disease, ask your doctor how much and what types of fluids you should drink to prevent dehydration. If you have kidney disease, drinking too much fluid can cause it build up in the your body and be dangerous to your health.  · Over-the-counter remedies won't shorten the length of the illness but may be helpful for cough, sore throat; and nasal and sinus congestion. Don't use decongestants if you have high blood pressure.  Follow-up care  Follow up with your healthcare provider if you do not improve over the next week.  Call 911  Get emergency medical care if any of the following occur:  · Convulsion  · Feeling weak, dizzy, or like you are going to faint  · Chest pain, shortness of breath, wheezing, or difficulty breathing  When to seek medical advice  Call your healthcare provider right away if any of these occur:  · Cough with lots of colored sputum (mucus) or blood in your sputum  · Chest pain, shortness of breath, wheezing, or difficulty breathing  · Severe headache; face, neck, or ear pain  · Severe, constant pain in the lower right side of your belly (abdominal)  · Continued vomiting (cant keep liquids down)  · Frequent diarrhea (more than 5 times a day); blood (red or black color) or mucus in diarrhea  · Feeling weak, dizzy, or like you are going to faint  · Extreme thirst  · Fever of 100.4°F (38°C) or higher, or as directed by your healthcare provider  Date Last Reviewed: 9/25/2015  © 4614-6263 [employer]. [address] [place] [address], [place] [address]. All rights reserved. This information is not intended as a substitute for professional medical  care. Always follow your healthcare professional's instructions.

## 2019-07-26 ENCOUNTER — OFFICE VISIT (OUTPATIENT)
Dept: FAMILY MEDICINE | Facility: CLINIC | Age: 24
End: 2019-07-26
Payer: COMMERCIAL

## 2019-07-26 VITALS
OXYGEN SATURATION: 98 % | DIASTOLIC BLOOD PRESSURE: 92 MMHG | SYSTOLIC BLOOD PRESSURE: 130 MMHG | WEIGHT: 153.81 LBS | BODY MASS INDEX: 22.02 KG/M2 | TEMPERATURE: 98 F | HEIGHT: 70 IN | HEART RATE: 82 BPM

## 2019-07-26 DIAGNOSIS — F43.0 ACUTE STRESS REACTION: ICD-10-CM

## 2019-07-26 DIAGNOSIS — Z13.6 ENCOUNTER FOR LIPID SCREENING FOR CARDIOVASCULAR DISEASE: ICD-10-CM

## 2019-07-26 DIAGNOSIS — H65.91 RIGHT SEROUS OTITIS MEDIA, UNSPECIFIED CHRONICITY: ICD-10-CM

## 2019-07-26 DIAGNOSIS — R03.0 ELEVATED BLOOD PRESSURE READING: ICD-10-CM

## 2019-07-26 DIAGNOSIS — Z00.00 ROUTINE GENERAL MEDICAL EXAMINATION AT A HEALTH CARE FACILITY: Primary | ICD-10-CM

## 2019-07-26 DIAGNOSIS — Z13.220 ENCOUNTER FOR LIPID SCREENING FOR CARDIOVASCULAR DISEASE: ICD-10-CM

## 2019-07-26 DIAGNOSIS — G47.00 INSOMNIA, UNSPECIFIED TYPE: ICD-10-CM

## 2019-07-26 DIAGNOSIS — J30.9 ALLERGIC RHINITIS, UNSPECIFIED SEASONALITY, UNSPECIFIED TRIGGER: ICD-10-CM

## 2019-07-26 PROCEDURE — 99999 PR PBB SHADOW E&M-EST. PATIENT-LVL IV: CPT | Mod: PBBFAC,,, | Performed by: INTERNAL MEDICINE

## 2019-07-26 PROCEDURE — 99395 PR PREVENTIVE VISIT,EST,18-39: ICD-10-PCS | Mod: S$GLB,,, | Performed by: INTERNAL MEDICINE

## 2019-07-26 PROCEDURE — 99395 PREV VISIT EST AGE 18-39: CPT | Mod: S$GLB,,, | Performed by: INTERNAL MEDICINE

## 2019-07-26 PROCEDURE — 99999 PR PBB SHADOW E&M-EST. PATIENT-LVL IV: ICD-10-PCS | Mod: PBBFAC,,, | Performed by: INTERNAL MEDICINE

## 2019-07-26 RX ORDER — IBUPROFEN AND FAMOTIDINE 26.6; 8 MG/1; MG/1
TABLET ORAL
COMMUNITY

## 2019-07-26 RX ORDER — LORATADINE 10 MG/1
10 TABLET ORAL DAILY
Qty: 30 TABLET | Refills: 3 | COMMUNITY
Start: 2019-07-26 | End: 2020-07-25

## 2019-07-26 NOTE — PATIENT INSTRUCTIONS
We have reviewed your prescription medications. I would like to get some routine blood work. I recommend that you start taking claritin (loratadine) on a daily basis. If you take it before bed, it may help you sleep. I will give you information on counselors/psychiatrists. If you would like me to prescribe celexa (citalopram) , let me know. Try to decrease your salt intake and check BP at work. I would like  Controlling High Blood Pressure  High blood pressure (hypertension) is often called the silent killer. This is because many people who have it dont know it. High blood pressure is defined as 140/90 mm Hg or higher. Know your blood pressure and remember to check it regularly. Doing so can save your life. Here are some things you can do to help control your blood pressure.    Choose heart-healthy foods  · Select low-salt, low-fat foods. Limit sodium intake to 2,400 mg per day or the amount suggested by your healthcare provider.  · Limit canned, dried, cured, packaged, and fast foods. These can contain a lot of salt.  · Eat 8 to 10 servings of fruits and vegetables every day.  · Choose lean meats, fish, or chicken.  · Eat whole-grain pasta, brown rice, and beans.  · Eat 2 to 3 servings of low-fat or fat-free dairy products.  · Ask your doctor about the DASH eating plan. This plan helps reduce blood pressure.  · When you go to a restaurant, ask that your meal be prepared with no added salt.  Maintain a healthy weight  · Ask your healthcare provider how many calories to eat a day. Then stick to that number.  · Ask your healthcare provider what weight range is healthiest for you. If you are overweight, a weight loss of only 3% to 5% of your body weight can help lower blood pressure. Generally, a good weight loss goal is to lose 10% of your body weight in a year.  · Limit snacks and sweets.  · Get regular exercise.  Get up and get active  · Choose activities you enjoy. Find ones you can do with friends or family. This  includes bicycling, dancing, walking, and jogging.  · Park farther away from building entrances.  · Use stairs instead of the elevator.  · When you can, walk or bike instead of driving.  · [place] leaves, garden, or do household repairs.  · Be active at a moderate to vigorous level of physical activity for at least 40 minutes for a minimum of 3 to 4 days a week.   Manage stress  · Make time to relax and enjoy life. Find time to laugh.  · Communicate your concerns with your loved ones and your healthcare provider.  · Visit with family and friends, and keep up with hobbies.  Limit alcohol and quit smoking  · Men should have no more than 2 drinks per day.  · Women should have no more than 1 drink per day.  · Talk with your healthcare provider about quitting smoking. Smoking significantly increases your risk for heart disease and stroke. Ask your healthcare provider about community smoking cessation programs and other options.  Medicines  If lifestyle changes arent enough, your healthcare provider may prescribe high blood pressure medicine. Take all medicines as prescribed. If you have any questions about your medicines, ask your healthcare provider before stopping or changing them.   Date Last Reviewed: 4/27/2016  © 2669-8325 [employer]. [address] [place] [address], [place] [address]. All rights reserved. This information is not intended as a substitute for professional medical care. Always follow your healthcare professional's instructions.       to check you again in about 3 months.

## 2019-07-29 NOTE — PROGRESS NOTES
Subjective:       Patient ID: Dayne Sanders is a 24 y.o. male.    Chief Complaint: Epistaxis; Insomnia; and Hypertension     I have reviewed the PMH,  and  for this patient. He  has a past medical history of Allergy and Mononucleosis (10/23/08).HE is here today with his fiance. He is concerned that he has occasionally had an elevated blood pressure. He has a family history of hypertension. He is personally opposed to medications. He is also complaining about frequent nose bleeds. He has had frequent nose bleeds since childhood. He has seen ENT in the past. They said he had a deviated septum, but no nasal polyps. He does have frequent sinus drainage. He has been told to take claritin in the past, and he only takes it for a few days. We talked about using flonase, but he would rather take a pill.     He has been dealing with a lot of anxiety . He and his fiance have recently purchased a house. He has a demanding work schedule  -- working days for 2 weeks and then nights for 2 weeks. He has a lot of concerns about adult responsibilities and what the future holds. He has been having trouble sleeping. He reports that melatonin makes him have bad nightmares. He has not tried benadryl for sleep.     Active Ambulatory Problems     Diagnosis Date Noted    Allergic rhinitis 03/23/2012    Acne 11/08/2013    Acute stress reaction 07/26/2019    Elevated blood pressure reading 07/26/2019     Resolved Ambulatory Problems     Diagnosis Date Noted    No Resolved Ambulatory Problems     Past Medical History:   Diagnosis Date    Allergy     Mononucleosis 10/23/08         MEDICATIONS:  Current Outpatient Medications:     ibuprofen-famotidine (DUEXIS) 800-26.6 mg Tab, Duexis 800 mg-26.6 mg tablet  TAKE ONE TABLET BY MOUTH THREE TIMES DAILY AS NEEDED, Disp: , Rfl:     loratadine (CLARITIN) 10 mg tablet, Take 1 tablet (10 mg total) by mouth once daily., Disp: 30 tablet, Rfl: 3    meclizine (ANTIVERT) 25 mg tablet, Take  1 tablet (25 mg total) by mouth 3 (three) times daily as needed., Disp: 30 tablet, Rfl: 0      HEALTH MAINTENANCE:   Health Maintenance   Topic Date Due    Pneumococcal Vaccine (Medium Risk) (1 of 1 - PPSV23) 05/16/2015    Influenza Vaccine  08/01/2019    TETANUS VACCINE  08/24/2019    Lipid Panel  Completed       Review of Systems   Constitutional: Negative for appetite change, chills, fatigue, fever and unexpected weight change.   HENT: Positive for postnasal drip and rhinorrhea. Negative for congestion, ear pain, mouth sores, sinus pressure, sinus pain and sore throat.    Eyes: Negative for photophobia, discharge, redness, itching and visual disturbance.   Respiratory: Negative for cough, chest tightness, shortness of breath and wheezing.    Cardiovascular: Negative for chest pain, palpitations and leg swelling.   Gastrointestinal: Negative for abdominal pain, blood in stool, constipation, diarrhea, nausea and vomiting.   Endocrine: Negative for cold intolerance and heat intolerance.   Genitourinary: Negative for difficulty urinating, discharge, dysuria, flank pain, frequency and urgency.   Musculoskeletal: Negative for arthralgias, back pain, joint swelling, myalgias and neck pain.   Skin: Negative for rash and wound.   Neurological: Negative for dizziness, tremors, syncope, weakness, light-headedness, numbness and headaches.   Hematological: Negative for adenopathy. Does not bruise/bleed easily.   Psychiatric/Behavioral: Positive for dysphoric mood and sleep disturbance. Negative for agitation and confusion. The patient is nervous/anxious.        Objective:          A1C:      CBC:      CMP:      LIPIDS:      TSH:            Physical Exam   Constitutional: He is oriented to person, place, and time. He appears well-developed and well-nourished. He does not have a sickly appearance.   HENT:   Head: Normocephalic and atraumatic. Hair is normal.   Right Ear: External ear and ear canal normal. Tympanic membrane  is not erythematous and not retracted. A middle ear effusion is present.   Left Ear: External ear and ear canal normal. Tympanic membrane is not erythematous and not retracted.  No middle ear effusion.   Nose: Rhinorrhea present. No mucosal edema. Right sinus exhibits no maxillary sinus tenderness and no frontal sinus tenderness. Left sinus exhibits no maxillary sinus tenderness and no frontal sinus tenderness.   Mouth/Throat: Mucous membranes are normal. Posterior oropharyngeal erythema present. No oropharyngeal exudate. No tonsillar exudate.   Enlarged, cryptic tonsils   Eyes: Pupils are equal, round, and reactive to light. Conjunctivae, EOM and lids are normal. Right eye exhibits no discharge. Left eye exhibits no discharge. No scleral icterus.   Neck: Normal range of motion. Neck supple. No JVD present. No tracheal tenderness and no muscular tenderness present. Carotid bruit is not present. No tracheal deviation present. No thyroid mass and no thyromegaly present.   Cardiovascular: Normal rate, regular rhythm, normal heart sounds and intact distal pulses. Exam reveals no gallop and no friction rub.   No murmur heard.  Pulmonary/Chest: No tachypnea. No respiratory distress. He has no wheezes. He has no rhonchi. He has no rales. He exhibits no tenderness.   Abdominal: Soft. Bowel sounds are normal. He exhibits no distension and no mass. There is no hepatosplenomegaly. There is no tenderness. There is no rebound and no guarding. No hernia.   Musculoskeletal: Normal range of motion. He exhibits no edema or tenderness.   Lymphadenopathy:        Head (right side): No submandibular adenopathy present.        Head (left side): No submandibular adenopathy present.     He has no cervical adenopathy.   Neurological: He is alert and oriented to person, place, and time. He has normal strength. He displays no tremor and normal reflexes. No cranial nerve deficit or sensory deficit.   Skin: Skin is warm and dry. No lesion and  no rash noted. No cyanosis or erythema. Nails show no clubbing.   Psychiatric: He has a normal mood and affect. His behavior is normal. Judgment normal. His mood appears not anxious. He is not agitated and not aggressive. He does not exhibit a depressed mood.   Vitals reviewed.            Assessment and Plan:     Problem List Items Addressed This Visit        Psychiatric    Acute stress reaction - I have suggested that he take  Celexa. He will research the medicine and let me know if he wants to try it. I have also given him information on counselors.        Cardiac/Vascular    Elevated blood pressure reading - we will check tsh and  Recheck bp next time.     Relevant Orders    TSH       Other    Allergic rhinitis - begin daily claritin.       Other Visit Diagnoses     Routine general medical examination at a health care facility    -  Primary - generally healthy    Relevant Orders    CBC auto differential    Comprehensive metabolic panel    Right serous otitis media, unspecified chronicity     - daily claritin should help.       Insomnia, unspecified type    - take claritin before bed to help with sleepiness.       Encounter for lipid screening for cardiovascular disease    - check lipids.     Relevant Orders    Lipid panel          Orders Placed This Encounter    CBC auto differential    Comprehensive metabolic panel    Lipid panel    TSH    loratadine (CLARITIN) 10 mg tablet         Follow-up with me in 3 months.       Mariia Aiken MD,  FACP  Internal Medicine

## 2019-08-02 ENCOUNTER — PATIENT MESSAGE (OUTPATIENT)
Dept: INTERNAL MEDICINE | Facility: CLINIC | Age: 24
End: 2019-08-02

## 2019-08-02 DIAGNOSIS — R77.8 ABNORMAL SERUM PROTEIN TEST: Primary | ICD-10-CM

## 2019-08-07 ENCOUNTER — TELEPHONE (OUTPATIENT)
Dept: INTERNAL MEDICINE | Facility: CLINIC | Age: 24
End: 2019-08-07

## 2019-08-07 NOTE — TELEPHONE ENCOUNTER
----- Message from Marcelina Aiken MD sent at 8/7/2019  8:24 AM CDT -----  The serum protein was normal this time.

## 2020-03-31 ENCOUNTER — PATIENT MESSAGE (OUTPATIENT)
Dept: FAMILY MEDICINE | Facility: CLINIC | Age: 25
End: 2020-03-31

## 2021-05-04 ENCOUNTER — PATIENT MESSAGE (OUTPATIENT)
Dept: RESEARCH | Facility: HOSPITAL | Age: 26
End: 2021-05-04

## 2024-04-11 ENCOUNTER — OFFICE VISIT (OUTPATIENT)
Dept: INTERNAL MEDICINE | Facility: CLINIC | Age: 29
End: 2024-04-11
Payer: COMMERCIAL

## 2024-04-11 ENCOUNTER — TELEPHONE (OUTPATIENT)
Dept: PSYCHIATRY | Facility: CLINIC | Age: 29
End: 2024-04-11
Payer: COMMERCIAL

## 2024-04-11 VITALS
OXYGEN SATURATION: 98 % | DIASTOLIC BLOOD PRESSURE: 74 MMHG | HEART RATE: 98 BPM | SYSTOLIC BLOOD PRESSURE: 122 MMHG | RESPIRATION RATE: 20 BRPM | HEIGHT: 70 IN | WEIGHT: 139.56 LBS | BODY MASS INDEX: 19.98 KG/M2

## 2024-04-11 DIAGNOSIS — F31.9 BIPOLAR AFFECTIVE DISORDER, REMISSION STATUS UNSPECIFIED: ICD-10-CM

## 2024-04-11 DIAGNOSIS — F20.9 SCHIZOPHRENIA, UNSPECIFIED TYPE: Primary | ICD-10-CM

## 2024-04-11 PROBLEM — R03.0 ELEVATED BLOOD PRESSURE READING: Status: RESOLVED | Noted: 2019-07-26 | Resolved: 2024-04-11

## 2024-04-11 PROCEDURE — 99203 OFFICE O/P NEW LOW 30 MIN: CPT | Mod: S$GLB,,, | Performed by: NURSE PRACTITIONER

## 2024-04-11 PROCEDURE — 99999 PR PBB SHADOW E&M-EST. PATIENT-LVL IV: CPT | Mod: PBBFAC,,, | Performed by: NURSE PRACTITIONER

## 2024-04-11 RX ORDER — PALIPERIDONE 6 MG/1
6 TABLET, EXTENDED RELEASE ORAL DAILY
COMMUNITY
End: 2024-05-10

## 2024-04-11 NOTE — PROGRESS NOTES
"Subjective:           Patient ID: Dayne Sanders is a 28 y.o. male.    Chief Complaint: Establish Care (Patient here today for establish care) and Referral (Would like a referral for psychiatrist "I was confused for a long time and I still feel confused at times" patient reports that he was inpatient at neuropsych unit in [place], [place] and was discharged "after a week with papers saying that I had schizophrenia, bipolar and personality disorder so I want to follow up with all of that since it was new to me")    Dayne Sanders is a 28 y.o. male, new to my practice  Here to establish care;     Establish Care (Patient here today for establish care) and Referral (Would like a referral for psychiatrist "I was confused for a long time and I still feel confused at times" patient reports that he was inpatient at neuropsychic unit and was discharged "after a week with papers saying that I had schizophrenia, bipolar and personality disorder so I want to follow up with all of that since it was new to me")      Admitted to psyche unit [facility] x 1 week ; discharged last Tuesday   [phone]-   Born in [place];  moved here with family as a child  States his family brought him to [place] and had him admitted because he was " Seeing a Higher"  power and wanting to act on what he was telling him to do';   " That's weird right'  Notes he was not sleeping   Discharged with [facility]   Since discharge Feeling better than when admitted but still not 100%        About 7 years ago dx with bipolar 2 schizoaffective disorder; he reports he was feeling very suspicious of his partner at the time but later found out his suspicions were justified ; notes he took seroquel in the past but later stopped because he did not think he needed and never followed up;       Feeling ready to return to work;   Has enough medication for 1 month     Will request records and labs from [place] before " ordering  Need to make sure normal TSH         Review of Systems   Constitutional: Negative.  Negative for chills, diaphoresis and fever.   HENT:  Negative for congestion, ear pain, sinus pressure, sneezing and sore throat.    Eyes: Negative.    Respiratory:  Negative for cough, chest tightness, shortness of breath, wheezing and stridor.    Cardiovascular: Negative.  Negative for chest pain.   Gastrointestinal:  Negative for abdominal pain, blood in stool, constipation, diarrhea, nausea and vomiting.   Genitourinary:  Negative for difficulty urinating, dysuria and flank pain.   Musculoskeletal: Negative.  Negative for joint swelling.   Skin: Negative.    Neurological:  Positive for light-headedness. Negative for dizziness, seizures, syncope, weakness and headaches.   Hematological: Negative.    Psychiatric/Behavioral:  Positive for confusion and sleep disturbance. Negative for behavioral problems and suicidal ideas. The patient is nervous/anxious.        Objective:      Physical Exam  Vitals and nursing note reviewed.   Constitutional:       General: He is not in acute distress.     Appearance: He is well-developed. He is not ill-appearing, toxic-appearing or diaphoretic.   HENT:      Head: Normocephalic and atraumatic.      Nose: Nose normal. No congestion or rhinorrhea.   Eyes:      Pupils: Pupils are equal, round, and reactive to light.   Neck:      Vascular: No carotid bruit.   Cardiovascular:      Rate and Rhythm: Normal rate and regular rhythm.      Heart sounds: No murmur heard.  Pulmonary:      Effort: Pulmonary effort is normal. No respiratory distress.      Breath sounds: Normal breath sounds. No stridor. No wheezing, rhonchi or rales.   Chest:      Chest wall: No tenderness.   Abdominal:      General: Bowel sounds are normal. There is no distension.      Palpations: Abdomen is soft. There is no mass.   Musculoskeletal:         General: Normal range of motion.      Cervical back: Normal range of motion and  neck supple. No rigidity or tenderness.   Lymphadenopathy:      Cervical: No cervical adenopathy.   Skin:     General: Skin is warm and dry.      Capillary Refill: Capillary refill takes less than 2 seconds.   Neurological:      Mental Status: He is alert and oriented to person, place, and time.   Psychiatric:         Behavior: Behavior normal.         Thought Content: Thought content normal.         Judgment: Judgment normal.         Assessment:       1. Schizophrenia, unspecified type    2. Bipolar affective disorder, remission status unspecified        Plan:   1. Schizophrenia, unspecified type  -     Ambulatory referral/consult to Psychiatry; Future; Expected date: 04/18/2024    2. Bipolar affective disorder, remission status unspecified  -     Ambulatory referral/consult to Psychiatry; Future; Expected date: 04/18/2024

## 2024-04-11 NOTE — TELEPHONE ENCOUNTER
----- Message from Lucy Gandhi sent at 2024 11:48 AM CDT -----  Contact: GENE Sanders  MRN: [id number]  : 1995  PCP: Marcelina Aiken  Home Phone      [phone]  Work Phone      Not on file.  Mobile          [phone]  Mobile          [phone]      MESSAGE: Gene states that the patient was [place].  He was treated for Bipolar Disorder and is needing his anti-psychotic medication filled.  She says that she does not fill comfortable filling it.          Phone: [phone]

## 2024-04-12 NOTE — TELEPHONE ENCOUNTER
Attempted to contact patient to schedule New Patient visit. No answer, unable to leave a voicemail.

## 2024-05-10 ENCOUNTER — OFFICE VISIT (OUTPATIENT)
Dept: PSYCHIATRY | Facility: CLINIC | Age: 29
End: 2024-05-10
Payer: COMMERCIAL

## 2024-05-10 ENCOUNTER — OFFICE VISIT (OUTPATIENT)
Dept: INTERNAL MEDICINE | Facility: CLINIC | Age: 29
End: 2024-05-10
Payer: COMMERCIAL

## 2024-05-10 VITALS
HEART RATE: 79 BPM | OXYGEN SATURATION: 98 % | WEIGHT: 161.81 LBS | BODY MASS INDEX: 24.52 KG/M2 | SYSTOLIC BLOOD PRESSURE: 111 MMHG | DIASTOLIC BLOOD PRESSURE: 76 MMHG | RESPIRATION RATE: 17 BRPM | HEIGHT: 68 IN

## 2024-05-10 VITALS
DIASTOLIC BLOOD PRESSURE: 88 MMHG | HEIGHT: 68 IN | WEIGHT: 161.81 LBS | BODY MASS INDEX: 24.52 KG/M2 | HEART RATE: 82 BPM | RESPIRATION RATE: 20 BRPM | SYSTOLIC BLOOD PRESSURE: 114 MMHG

## 2024-05-10 DIAGNOSIS — Z79.899 ON LONG TERM DRUG THERAPY: ICD-10-CM

## 2024-05-10 DIAGNOSIS — J02.9 SORE THROAT: ICD-10-CM

## 2024-05-10 DIAGNOSIS — F31.81 BIPOLAR 2 DISORDER, MAJOR DEPRESSIVE EPISODE: ICD-10-CM

## 2024-05-10 DIAGNOSIS — R09.82 POST-NASAL DRIP: ICD-10-CM

## 2024-05-10 DIAGNOSIS — Z23 IMMUNIZATION DUE: ICD-10-CM

## 2024-05-10 DIAGNOSIS — F20.9 SCHIZOPHRENIA, UNSPECIFIED TYPE: Primary | ICD-10-CM

## 2024-05-10 DIAGNOSIS — J03.90 TONSILLITIS: ICD-10-CM

## 2024-05-10 DIAGNOSIS — F19.10: ICD-10-CM

## 2024-05-10 LAB
CTP QC/QA: YES
MOLECULAR STREP A: NEGATIVE

## 2024-05-10 PROCEDURE — 87651 STREP A DNA AMP PROBE: CPT | Mod: QW,S$GLB,, | Performed by: NURSE PRACTITIONER

## 2024-05-10 PROCEDURE — 90715 TDAP VACCINE 7 YRS/> IM: CPT | Mod: S$GLB,,, | Performed by: NURSE PRACTITIONER

## 2024-05-10 PROCEDURE — 99214 OFFICE O/P EST MOD 30 MIN: CPT | Mod: 25,S$GLB,, | Performed by: NURSE PRACTITIONER

## 2024-05-10 PROCEDURE — 99999 PR PBB SHADOW E&M-EST. PATIENT-LVL IV: CPT | Mod: PBBFAC,,, | Performed by: NURSE PRACTITIONER

## 2024-05-10 PROCEDURE — 90471 IMMUNIZATION ADMIN: CPT | Mod: S$GLB,,, | Performed by: NURSE PRACTITIONER

## 2024-05-10 PROCEDURE — 90792 PSYCH DIAG EVAL W/MED SRVCS: CPT | Mod: S$GLB,,,

## 2024-05-10 PROCEDURE — 99999 PR PBB SHADOW E&M-EST. PATIENT-LVL III: CPT | Mod: PBBFAC,,,

## 2024-05-10 RX ORDER — BREXPIPRAZOLE 1 MG/1
1 TABLET ORAL NIGHTLY
Qty: 30 TABLET | Refills: 1 | Status: SHIPPED | OUTPATIENT
Start: 2024-05-10 | End: 2024-07-09

## 2024-05-10 NOTE — PROGRESS NOTES
"Subjective:           Patient ID: Dayne Sanders is a 28 y.o. male.    Chief Complaint: Follow-up (Patient here today for 1 month follow up "I'm here taking crazy pills how bout you")    Dayne Sanders is a 28 y.o. male, established last month after recent psychiatric admission  Needing Referral    Admitted to psyche unit [facility] x 1 week ; early April      About 7 years ago dx with bipolar 2 schizoaffective disorder; he reports he was feeling very suspicious of his partner at the time but later found out his suspicions were justified ; notes he took seroquel in the past but later stopped because he did not think he needed and never followed up;         Planned for labs  C/o scratchy throat ; notes long hx of tonsillitis and tonsil stones   Notes PND- does not think he has strep   No fever        Follow-up  Associated symptoms include a sore throat. Pertinent negatives include no abdominal pain, chest pain, congestion, coughing, headaches, joint swelling, nausea, vomiting or weakness.     Review of Systems   Constitutional: Negative.    HENT:  Positive for postnasal drip and sore throat. Negative for congestion, ear pain, sinus pressure and sneezing.    Eyes: Negative.    Respiratory:  Negative for cough, chest tightness, shortness of breath and wheezing.    Cardiovascular: Negative.  Negative for chest pain.   Gastrointestinal:  Negative for abdominal pain, blood in stool, constipation, diarrhea, nausea and vomiting.   Genitourinary:  Negative for difficulty urinating, dysuria and flank pain.   Musculoskeletal: Negative.  Negative for joint swelling.   Skin: Negative.    Neurological:  Negative for dizziness, weakness and headaches.   Hematological: Negative.    Psychiatric/Behavioral: Negative.  Negative for behavioral problems and confusion.        Objective:      Physical Exam  Constitutional:       Appearance: He is well-developed.   HENT:      Head: Normocephalic and " "atraumatic.      Nose: Nose normal.      Mouth/Throat:      Pharynx: Posterior oropharyngeal erythema present. No pharyngeal swelling or oropharyngeal exudate.      Tonsils: 3+ on the right. 3+ on the left.   Eyes:      Pupils: Pupils are equal, round, and reactive to light.   Cardiovascular:      Rate and Rhythm: Normal rate and regular rhythm.      Heart sounds: No murmur heard.  Pulmonary:      Effort: Pulmonary effort is normal. No respiratory distress.      Breath sounds: Normal breath sounds. No stridor. No wheezing, rhonchi or rales.   Chest:      Chest wall: No tenderness.   Abdominal:      General: Bowel sounds are normal.      Palpations: Abdomen is soft.   Musculoskeletal:         General: Normal range of motion.      Cervical back: Normal range of motion and neck supple.   Skin:     General: Skin is warm and dry.      Capillary Refill: Capillary refill takes less than 2 seconds.   Neurological:      Mental Status: He is alert and oriented to person, place, and time.   Psychiatric:         Behavior: Behavior normal.         Thought Content: Thought content normal.         Judgment: Judgment normal.         Assessment:       1. Schizophrenia, unspecified type    2. Sore throat    3. Tonsillitis    4. Post-nasal drip    5. Immunization due        Plan:   1. Schizophrenia, unspecified type  Overview:  About 7 years ago dx with bipolar 2 schizoaffective disorder;   notes he took seroquel in the past but later stopped because he did not think he needed and never followed up    Admitted to psyche unit [facility] x 1 week ; early April 2024 ([phone])    Born in [place];  moved here with family as a child  States his family brought him to [place] and had him admitted because he was " Seeing a Higher"  power and wanting to act on what he was telling him to do'; Notes he was not sleeping   Discharged with [facility]   Had appnt with psychiatry this am     Was taking [facility] 6mg " daily     Rexulti 1mg nightly ordered     Assessment & Plan:  F/u with psychiatry for tx  Planned for labs tomorrow       2. Sore throat  -     POCT Strep A, Molecular    3. Tonsillitis    4. Post-nasal drip    5. Immunization due  -     Tdap (BOOSTRIX) vaccine injection 0.5 mL       Strep negative   Discussed supportive care  Future ENT referral if worsening or recurrent symptoms

## 2024-05-10 NOTE — PROGRESS NOTES
"Outpatient Psychiatry Initial Visit (MD/NP)    5/10/2024    Dayne Sanders, a 29 y.o. male, presenting for initial evaluation visit. Met with patient.    Reason for Encounter: Referral from Mercy Chow NP . Patient complains of " needing to establish care."     History of Present Illness:   Dayne Sanders is a 29 y.o. male who presents today to establish care for medication management of schizophrenia.  He states       "I have been taking Invega everyday. I get really stressed a lot about things in my life that is compiled together. I work in  and had a recent audit. That was stressful so I went away to [facility] to visit my family. While in [place] I started having hallucinations and became suspicions. I was admitted to an inpatient neuropsych unit in [place], [place] and was discharged "after a week with papers saying that I had schizophrenia, bipolar and personality disorder."I was prescribed Invega and have been taking everyday since I got out of the hospital. But I feel worse on Invega."  He reports sexual problems since starting Invega and states he has a new girlfriend and the sexual side effects from Invega is a problem.    He states "5 years ago" he was "started on Seroquel by a psychiatrist at [facility] and was diagnosed with Bipolar 2." He reports feeling "flat" while on Seroquel.     He states he was smoking pot heavily for years but stopped 2 months ago and has not smoked again.  He states he did use cocaine in the past for 2 weeks.    Complains of symptoms of depression including diminished mood or loss of interest/anhedonia, decreased energy, difficulty with sleep, poor appetite, decreased concentration and excessive guilt and hopelessness.       Admits to symptoms of anxiety including excessive anxiety/worry/fear, more days than not, about numerous issues, difficulty controlling the worry, restlessness, poor concentration, fatigue, and increased irritability. Denies panic " "attacks at this time.    -he denies current SI/HI/AVH/paranoia and/or delusions.    Appetite is good denies any recent unintentional weight loss or gain      Risk Parameters:   Patient reports no suicidal ideation  Patient reports no homicidal ideations  Patient reports no violent behavior  Patient reports no self-injurious behavior    Patient has good social support, denies any substance use/medication abuse, and is future oriented    Previous medication trails include  Seroquel- not effective, " felt flat"  Invega- decrease libido, weight gain, no emotional range    Medication Compliance yes    Past Medical, Family and Social History: The patient's past medical, family and social history have been reviewed and updated as appropriate within the electronic medical record. See encounter notes.      Variable Changes in Sleep: + trouble with initiation/maintenance, no early morning awakening with inability to return to sleep, no hypersomnolence     Denied Suicidal/Homicidal ideations: no active/passive ideations, organized plans, or future intentions; he has no past SAs or violence     Denied current Symptoms of psychosis: no hallucinations, delusions, disorganized thinking, disorganized behavior or abnormal motor behavior, or negative symptoms       Reports variable Symptoms of mikel or hypomania: no elevated, expansive, or irritable mood with increased energy or activity; with no inflated self-esteem or grandiosity, no decreased need for sleep, no increased rate of speech, no FOI, reports racing thoughts, distractibility, +increased goal directed activity, no PMA, + risky/disinhibited behavior    We discussed meds as below; the patient is in agreement to changing treatment as documented below.    Review Of Systems:     A comprehensive review of systems was negative except for Behavioral/Psych: positive for anxiety and depression    Current Evaluation:     Nutritional Screening: Considering the patient's height and " "weight, medications, medical history and preferences, should a referral be made to the dietitian? no    Constitutional  Vitals:  Most recent vital signs, dated less than 90 days prior to this appointment, were reviewed.    Vitals:    05/10/24 1000   BP: 111/76   Pulse: 79   Resp: 17   SpO2: 98%   Weight: 73.4 kg (161 lb 12.8 oz)   Height: 5' 8" (1.727 m)      General:  unremarkable, age appropriate, normal weight, well nourished, casually dressed, neatly groomed     Musculoskeletal  Muscle Strength/Tone:  no spasicity, no rigidity, no cogwheeling, no flaccidity, no paratonia, no dyskinesia, no dystonia, no tremor, no tic, no choreoathetosis, no atrophy   Gait & Station:  non-ataxic     Psychiatric  Speech:  no latency; no press, spontaneous   Mood & Affect:  anxious  congruent and appropriate, anxious   Thought Process:  goal-directed, logical, racing   Associations:  intact   Thought Content:  normal, no suicidality, no homicidality, delusions, or paranoia   Insight:  intact, limited awareness of illness   Judgement: behavior is adequate to circumstances, age appropriate   Orientation:  grossly intact, person, place, situation, day of week, month of year, year, stated date of " May/10/2024."   Memory: intact for content of interview, grossly intact, memory >3 objects at five mins, able to remember recent events- Yes, able to remember remote events- Yes   Language: grossly intact, able to name, able to repeat   Attention Span & Concentration:  able to focus, completed tasks   Fund of Knowledge:  intact and appropriate to age and level of education, familiar with aspects of current personal life, 3 of 4 recent presidents       Relevant Elements of Neurological Exam: normal gait    Functioning in Relationships:  Spouse/partner: " strained "   Peers: "cordial"  Employers: " cordial"    Laboratory Data  Office Visit on 05/10/2024   Component Date Value Ref Range Status    Molecular Strep A, POC 05/10/2024 Negative  " Negative Final     Acceptable 05/10/2024 Yes   Final         Medications  Outpatient Encounter Medications as of 5/10/2024   Medication Sig Dispense Refill    loratadine (CLARITIN) 10 mg tablet Take 1 tablet (10 mg total) by mouth once daily. 30 tablet 3    [DISCONTINUED] paliperidone (INVEGA) 6 MG TR24 Take 6 mg by mouth once daily.      brexpiprazole (REXULTI) 1 mg Tab Take 1 tablet (1 mg total) by mouth every evening. 30 tablet 1    ibuprofen-famotidine (DUEXIS) 800-26.6 mg Tab Duexis 800 mg-26.6 mg tablet   TAKE ONE TABLET BY MOUTH THREE TIMES DAILY AS NEEDED (Patient not taking: Reported on 4/11/2024)      meclizine (ANTIVERT) 25 mg tablet Take 1 tablet (25 mg total) by mouth 3 (three) times daily as needed. (Patient not taking: Reported on 5/10/2024) 30 tablet 0     No facility-administered encounter medications on file as of 5/10/2024.     Assessment - Diagnosis - Goals:     Impression:       ICD-10-CM ICD-9-CM   1. Schizophrenia, unspecified type  F20.9 295.90   2. Bipolar 2 disorder, major depressive episode  F31.81 296.89   3. On long term drug therapy  Z79.899 V58.69   4. Drug abuse, episodic use  F19.10 305.92       Strengths and Liabilities: Strength: Patient accepts guidance/feedback, Strength: Patient is expressive/articulate., Strength: Patient is intelligent., Strength: Patient is motivated for change., Strength: Patient is physically healthy., Strength: Patient has positive support network., Strength: Patient has reasonable judgment., Liability: Patient lacks coping skills.    Treatment Goals:    Anxiety: acquiring relapse prevention skills, reducing negative automatic thoughts, and reducing physical symptoms of anxiety  Depression: increasing energy, increasing interest in usual activities, increasing motivation, reducing fatigue, and reducing negative automatic thoughts    Treatment Plan/Recommendations:   Medication Management: The risks and benefits of medication were discussed  with the patient.  Referral for further treatment to psychologist for psychotherapy  Labs: ordered CBC, CMP, TSH, Vitamin B12, Folate, UDS, Lipid panel, and Hemoglobin A1C  The treatment plan and follow up plan were reviewed with the patient.      Plan  Schizophrenia   Start Rexulti 1MG PO nightly  Stop Invega  Refer for psychotherapy, psychotherapy provided during this encounter  Pt. counseled  Bipolar affective Disorder  Start Rexulti 1MG PO nightly  Stop Invega  Refer for psychotherapy, psychotherapy provided during this encounter  Pt. counseled  MDD  Start Rexulti 1MG PO nightly  Stop Invega  Refer for psychotherapy, psychotherapy provided during this encounter  Pt. counseled    Drug abuse episodic  Advised patient to Avoid drug use  Refer for psychotherapy, psychotherapy provided during this encounter  Pt. counseled    Therapy   Refer for psychotherapy  Psychotherapy provided during this encounter    Medical stressors:  Patient counseled, continue medications/treatment as scheduled f/u with providers as scheduled     Goals:   Develop effective coping skills to manage anxiety and depression  Develop a bedtime routine         Antipsychotic Initiation: Typical RIGOEBRTO's reviewed including weight gain, abnormal movements, EPS, TD, metabolic side effects    Discussed with patient informed consent, risks vs. benefits, alternative treatments, side effect profile and the inherent unpredictability of individual responses to these treatments. The patient expresses understanding of the above and displays the capacity to agree with this current plan and had no other questions. The patient also agrees that currently, the benefits outweigh the risks and would like to pursue treatment at this time   Encouraged Patient to keep future appointments.  Take medications as prescribed and abstain from substance abuse.    Safety plan reviewed with patient for worsening condition or suicidal ideations. In the event of an emergency patient  was advised to go to the emergency room.  Patient verbalizes understanding.    The patient was seen and examined.  His chart was reviewed. Reviewed notes by Mercy Chow NP from 4/11/2024 at 10:40 AM.       Approximately 63 minutes of total time spent on this encounter, which includes face to face time, and non-face to face time preparing to see the patient (eg, review of labs/tests), obtaining and or reviewing separately obtained history, documenting clinical information in the electronic or other health record, independently interpreting results (not separately reported) and communicating results to the patient/family/caregiver or care coordination (not separately reported).                                                      Return to Clinic: 6 weeks or sooner if needed

## 2024-05-11 ENCOUNTER — LAB VISIT (OUTPATIENT)
Dept: LAB | Facility: HOSPITAL | Age: 29
End: 2024-05-11
Payer: COMMERCIAL

## 2024-05-11 DIAGNOSIS — Z79.899 ON LONG TERM DRUG THERAPY: ICD-10-CM

## 2024-05-11 LAB
ALBUMIN SERPL BCP-MCNC: 4.2 G/DL (ref 3.5–5.2)
ALP SERPL-CCNC: 59 U/L (ref 55–135)
ALT SERPL W/O P-5'-P-CCNC: 93 U/L (ref 10–44)
AMPHET+METHAMPHET UR QL: NEGATIVE
ANION GAP SERPL CALC-SCNC: 10 MMOL/L (ref 8–16)
AST SERPL-CCNC: 25 U/L (ref 10–40)
BARBITURATES UR QL SCN>200 NG/ML: NEGATIVE
BASOPHILS # BLD AUTO: 0.02 K/UL (ref 0–0.2)
BASOPHILS NFR BLD: 0.3 % (ref 0–1.9)
BENZODIAZ UR QL SCN>200 NG/ML: NEGATIVE
BILIRUB SERPL-MCNC: 0.4 MG/DL (ref 0.1–1)
BUN SERPL-MCNC: 15 MG/DL (ref 6–20)
BZE UR QL SCN: NEGATIVE
CALCIUM SERPL-MCNC: 9.5 MG/DL (ref 8.7–10.5)
CANNABINOIDS UR QL SCN: NEGATIVE
CHLORIDE SERPL-SCNC: 105 MMOL/L (ref 95–110)
CHOLEST SERPL-MCNC: 239 MG/DL (ref 120–199)
CHOLEST/HDLC SERPL: 3.1 {RATIO} (ref 2–5)
CO2 SERPL-SCNC: 24 MMOL/L (ref 23–29)
CREAT SERPL-MCNC: 0.8 MG/DL (ref 0.5–1.4)
CREAT UR-MCNC: 89 MG/DL (ref 23–375)
DIFFERENTIAL METHOD BLD: NORMAL
EOSINOPHIL # BLD AUTO: 0.1 K/UL (ref 0–0.5)
EOSINOPHIL NFR BLD: 1.1 % (ref 0–8)
ERYTHROCYTE [DISTWIDTH] IN BLOOD BY AUTOMATED COUNT: 12.5 % (ref 11.5–14.5)
EST. GFR  (NO RACE VARIABLE): >60 ML/MIN/1.73 M^2
ESTIMATED AVG GLUCOSE: 105 MG/DL (ref 68–131)
ETHANOL UR-MCNC: <10 MG/DL
FOLATE SERPL-MCNC: 15.4 NG/ML (ref 4–24)
GLUCOSE SERPL-MCNC: 94 MG/DL (ref 70–110)
HBA1C MFR BLD: 5.3 % (ref 4–5.6)
HCT VFR BLD AUTO: 44.2 % (ref 40–54)
HDLC SERPL-MCNC: 76 MG/DL (ref 40–75)
HDLC SERPL: 31.8 % (ref 20–50)
HGB BLD-MCNC: 14.8 G/DL (ref 14–18)
IMM GRANULOCYTES # BLD AUTO: 0.02 K/UL (ref 0–0.04)
IMM GRANULOCYTES NFR BLD AUTO: 0.3 % (ref 0–0.5)
LDLC SERPL CALC-MCNC: 148 MG/DL (ref 63–159)
LYMPHOCYTES # BLD AUTO: 1.5 K/UL (ref 1–4.8)
LYMPHOCYTES NFR BLD: 22.5 % (ref 18–48)
MCH RBC QN AUTO: 30.9 PG (ref 27–31)
MCHC RBC AUTO-ENTMCNC: 33.5 G/DL (ref 32–36)
MCV RBC AUTO: 92 FL (ref 82–98)
METHADONE UR QL SCN>300 NG/ML: NEGATIVE
MONOCYTES # BLD AUTO: 0.6 K/UL (ref 0.3–1)
MONOCYTES NFR BLD: 8.4 % (ref 4–15)
NEUTROPHILS # BLD AUTO: 4.5 K/UL (ref 1.8–7.7)
NEUTROPHILS NFR BLD: 67.4 % (ref 38–73)
NONHDLC SERPL-MCNC: 163 MG/DL
NRBC BLD-RTO: 0 /100 WBC
OPIATES UR QL SCN: NEGATIVE
PCP UR QL SCN>25 NG/ML: NEGATIVE
PLATELET # BLD AUTO: 205 K/UL (ref 150–450)
PMV BLD AUTO: 9.7 FL (ref 9.2–12.9)
POTASSIUM SERPL-SCNC: 4.4 MMOL/L (ref 3.5–5.1)
PROT SERPL-MCNC: 7.5 G/DL (ref 6–8.4)
RBC # BLD AUTO: 4.79 M/UL (ref 4.6–6.2)
SODIUM SERPL-SCNC: 139 MMOL/L (ref 136–145)
TOXICOLOGY INFORMATION: NORMAL
TRIGL SERPL-MCNC: 75 MG/DL (ref 30–150)
TSH SERPL DL<=0.005 MIU/L-ACNC: 0.9 UIU/ML (ref 0.4–4)
VIT B12 SERPL-MCNC: >2000 PG/ML (ref 210–950)
WBC # BLD AUTO: 6.63 K/UL (ref 3.9–12.7)

## 2024-05-11 PROCEDURE — 80061 LIPID PANEL: CPT

## 2024-05-11 PROCEDURE — 83036 HEMOGLOBIN GLYCOSYLATED A1C: CPT

## 2024-05-11 PROCEDURE — 82746 ASSAY OF FOLIC ACID SERUM: CPT

## 2024-05-11 PROCEDURE — 85025 COMPLETE CBC W/AUTO DIFF WBC: CPT

## 2024-05-11 PROCEDURE — 80307 DRUG TEST PRSMV CHEM ANLYZR: CPT

## 2024-05-11 PROCEDURE — 84443 ASSAY THYROID STIM HORMONE: CPT

## 2024-05-11 PROCEDURE — 82607 VITAMIN B-12: CPT

## 2024-05-11 PROCEDURE — 80053 COMPREHEN METABOLIC PANEL: CPT

## 2024-05-11 PROCEDURE — 36415 COLL VENOUS BLD VENIPUNCTURE: CPT

## 2024-05-14 ENCOUNTER — OFFICE VISIT (OUTPATIENT)
Dept: INTERNAL MEDICINE | Facility: CLINIC | Age: 29
End: 2024-05-14
Payer: COMMERCIAL

## 2024-05-14 VITALS
OXYGEN SATURATION: 99 % | BODY MASS INDEX: 24.52 KG/M2 | RESPIRATION RATE: 20 BRPM | WEIGHT: 161.81 LBS | SYSTOLIC BLOOD PRESSURE: 118 MMHG | DIASTOLIC BLOOD PRESSURE: 72 MMHG | HEART RATE: 77 BPM | HEIGHT: 68 IN

## 2024-05-14 DIAGNOSIS — R74.01 ELEVATED ALT MEASUREMENT: Primary | ICD-10-CM

## 2024-05-14 DIAGNOSIS — R74.8 ELEVATED VITAMIN B12 LEVEL: ICD-10-CM

## 2024-05-14 DIAGNOSIS — E78.49 OTHER HYPERLIPIDEMIA: ICD-10-CM

## 2024-05-14 PROCEDURE — 99213 OFFICE O/P EST LOW 20 MIN: CPT | Mod: S$GLB,,, | Performed by: NURSE PRACTITIONER

## 2024-05-14 PROCEDURE — 99999 PR PBB SHADOW E&M-EST. PATIENT-LVL III: CPT | Mod: PBBFAC,,, | Performed by: NURSE PRACTITIONER

## 2024-05-14 NOTE — PROGRESS NOTES
Subjective:           Patient ID: Dayne Sanders is a 28 y.o. male.    Chief Complaint: Results (Patient here today to discuss lab results)    Dayne Sanders is a 28 y.o. male known to me, recently established ; DxAbout 7 years ago dx with bipolar 2 schizoaffective disorder;   Now following with psychiatry   Had recent labs per psychiatry; here for f/u of abnormal labs  Lab Results       Component                Value               Date                       ALT                      93 (H)              05/11/2024     Notes   Notes the week before had been drinking daily  while on beach vacation; does not usually drink   Will repeat LFTs in 1m          B12 > 2000  He has been taking supplement 5,000 daily - discussed holding   Can take weekly in future if desires     Lipids abnormal     Father has luann cholesterol    Brother has hypoplastic left ventricle    No premature primary family hx     Lab Results       Component                Value               Date                       CHOL                     239 (H)             05/11/2024                 CHOL                     208 (H)             07/31/2019                 CHOL                     166                 11/02/2012            Lab Results       Component                Value               Date                       HDL                      76 (H)              05/11/2024                 HDL                      70                  07/31/2019                 HDL                      52                  11/02/2012            Lab Results       Component                Value               Date                       LDLCALC                  148.0               05/11/2024                 LDLCALC                  127.4               07/31/2019                 LDLCALC                  101.0               11/02/2012            Lab Results       Component                Value               Date                       TRIG                     75                   05/11/2024                 TRIG                     53                  07/31/2019                 TRIG                     63                  11/02/2012              Lab Results       Component                Value               Date                       CHOLHDL                  31.8                05/11/2024                 CHOLHDL                  33.7                07/31/2019                 CHOLHDL                  31.3                11/02/2012                B12 elevated- was taking 5,000mcg daily     Notes the week before had been drinking while on beach vacation;   Will repeat LFTs in 1m         Review of Systems   Constitutional: Negative.    HENT:  Negative for congestion, ear pain, sinus pressure, sneezing and sore throat.    Eyes: Negative.    Respiratory:  Negative for cough, chest tightness, shortness of breath and wheezing.    Cardiovascular: Negative.  Negative for chest pain.   Gastrointestinal:  Negative for abdominal pain, blood in stool, constipation, diarrhea, nausea and vomiting.   Genitourinary:  Negative for difficulty urinating, dysuria and flank pain.   Musculoskeletal: Negative.  Negative for joint swelling.   Skin: Negative.    Neurological:  Negative for dizziness, weakness and headaches.   Hematological: Negative.    Psychiatric/Behavioral: Negative.  Negative for behavioral problems and confusion.        Objective:      Physical Exam  Constitutional:       Appearance: He is well-developed.   HENT:      Head: Normocephalic and atraumatic.      Nose: Nose normal.   Eyes:      Pupils: Pupils are equal, round, and reactive to light.   Cardiovascular:      Rate and Rhythm: Normal rate and regular rhythm.      Heart sounds: No murmur heard.  Pulmonary:      Effort: Pulmonary effort is normal.      Breath sounds: Normal breath sounds.   Abdominal:      General: Bowel sounds are normal.      Palpations: Abdomen is soft.   Musculoskeletal:         General: Normal range of motion.       Cervical back: Normal range of motion and neck supple.   Skin:     General: Skin is warm and dry.      Capillary Refill: Capillary refill takes less than 2 seconds.   Neurological:      Mental Status: He is alert and oriented to person, place, and time.   Psychiatric:         Behavior: Behavior normal.         Thought Content: Thought content normal.         Judgment: Judgment normal.         Assessment:       1. Elevated ALT measurement    2. Other hyperlipidemia    3. Elevated vitamin B12 level        Plan:   1. Elevated ALT measurement  -     Comprehensive Metabolic Panel; Future; Expected date: 06/21/2024  -     ALT (SGPT); Future; Expected date: 06/21/2024    2. Other hyperlipidemia  Overview:  Lab Results   Component Value Date    CHOL 239 (H) 05/11/2024    CHOL 208 (H) 07/31/2019    CHOL 166 11/02/2012     Lab Results   Component Value Date    HDL 76 (H) 05/11/2024    HDL 70 07/31/2019    HDL 52 11/02/2012     Lab Results   Component Value Date    LDLCALC 148.0 05/11/2024    LDLCALC 127.4 07/31/2019    LDLCALC 101.0 11/02/2012     Lab Results   Component Value Date    TRIG 75 05/11/2024    TRIG 53 07/31/2019    TRIG 63 11/02/2012       Lab Results   Component Value Date    CHOLHDL 31.8 05/11/2024    CHOLHDL 33.7 07/31/2019    CHOLHDL 31.3 11/02/2012     Lab Results   Component Value Date    ALT 93 (H) 05/11/2024    AST 25 05/11/2024    ALKPHOS 59 05/11/2024    BILITOT 0.4 05/11/2024     Father has luann cholesterol    Brother has hypoplastic left ventricle    No premature primary family hx   Discussed low cholesterol diet   Avoid fried , fatty foods, limit red meats, increase fruits and vegetables  Will repeat in 1m with ALT     Assessment & Plan:  Stable, monitor     Orders:  -     Lipid Panel; Future; Expected date: 06/21/2024    3. Elevated vitamin B12 level  Comments:  due to supplement; will hold       Repeat labs in 1m  Will call   F/u only if abnormal

## 2024-06-21 ENCOUNTER — LAB VISIT (OUTPATIENT)
Dept: LAB | Facility: HOSPITAL | Age: 29
End: 2024-06-21
Attending: NURSE PRACTITIONER
Payer: COMMERCIAL

## 2024-06-21 DIAGNOSIS — R74.01 ELEVATED ALT MEASUREMENT: ICD-10-CM

## 2024-06-21 DIAGNOSIS — E78.49 OTHER HYPERLIPIDEMIA: ICD-10-CM

## 2024-06-21 LAB
ALBUMIN SERPL BCP-MCNC: 4.1 G/DL (ref 3.5–5.2)
ALP SERPL-CCNC: 50 U/L (ref 55–135)
ALT SERPL W/O P-5'-P-CCNC: 30 U/L (ref 10–44)
ALT SERPL W/O P-5'-P-CCNC: 30 U/L (ref 10–44)
ANION GAP SERPL CALC-SCNC: 8 MMOL/L (ref 8–16)
AST SERPL-CCNC: 20 U/L (ref 10–40)
BILIRUB SERPL-MCNC: 0.4 MG/DL (ref 0.1–1)
BUN SERPL-MCNC: 15 MG/DL (ref 6–20)
CALCIUM SERPL-MCNC: 9.6 MG/DL (ref 8.7–10.5)
CHLORIDE SERPL-SCNC: 104 MMOL/L (ref 95–110)
CHOLEST SERPL-MCNC: 217 MG/DL (ref 120–199)
CHOLEST/HDLC SERPL: 3.4 {RATIO} (ref 2–5)
CO2 SERPL-SCNC: 26 MMOL/L (ref 23–29)
CREAT SERPL-MCNC: 0.9 MG/DL (ref 0.5–1.4)
EST. GFR  (NO RACE VARIABLE): >60 ML/MIN/1.73 M^2
GLUCOSE SERPL-MCNC: 103 MG/DL (ref 70–110)
HDLC SERPL-MCNC: 64 MG/DL (ref 40–75)
HDLC SERPL: 29.5 % (ref 20–50)
LDLC SERPL CALC-MCNC: 144.2 MG/DL (ref 63–159)
NONHDLC SERPL-MCNC: 153 MG/DL
POTASSIUM SERPL-SCNC: 4.2 MMOL/L (ref 3.5–5.1)
PROT SERPL-MCNC: 7.4 G/DL (ref 6–8.4)
SODIUM SERPL-SCNC: 138 MMOL/L (ref 136–145)
TRIGL SERPL-MCNC: 44 MG/DL (ref 30–150)

## 2024-06-21 PROCEDURE — 80061 LIPID PANEL: CPT | Performed by: NURSE PRACTITIONER

## 2024-06-21 PROCEDURE — 36415 COLL VENOUS BLD VENIPUNCTURE: CPT | Performed by: NURSE PRACTITIONER

## 2024-06-21 PROCEDURE — 80053 COMPREHEN METABOLIC PANEL: CPT | Performed by: NURSE PRACTITIONER

## 2024-06-25 NOTE — PROGRESS NOTES
Please call the patient regarding his abnormal result.   CMP normal ; LFTs normal ; total cholesterol 217; goal I s <200 ; I see he has hx of ^Cholesterol over the years; likely familial  I recommend low fat, low cholesterol diet; avoid fried foods, increase vegetables and fruits  Can repeat lipids in 6m-1yr

## 2024-09-28 ENCOUNTER — HOSPITAL ENCOUNTER (EMERGENCY)
Facility: HOSPITAL | Age: 29
Discharge: HOME | End: 2024-09-28
Attending: EMERGENCY MEDICINE
Payer: COMMERCIAL

## 2024-09-28 VITALS
OXYGEN SATURATION: 100 % | SYSTOLIC BLOOD PRESSURE: 143 MMHG | HEART RATE: 88 BPM | TEMPERATURE: 98 F | BODY MASS INDEX: 25.85 KG/M2 | DIASTOLIC BLOOD PRESSURE: 93 MMHG | WEIGHT: 170 LBS | RESPIRATION RATE: 20 BRPM

## 2024-09-28 DIAGNOSIS — S61.211A LACERATION OF LEFT INDEX FINGER WITHOUT FOREIGN BODY WITHOUT DAMAGE TO NAIL, INITIAL ENCOUNTER: Primary | ICD-10-CM

## 2024-09-28 PROCEDURE — 29130 APPL FINGER SPLINT STATIC: CPT | Mod: F1,59

## 2024-09-28 PROCEDURE — 25000003 PHARM REV CODE 250: Performed by: NURSE PRACTITIONER

## 2024-09-28 PROCEDURE — 99282 EMERGENCY DEPT VISIT SF MDM: CPT | Mod: 25

## 2024-09-28 PROCEDURE — 12001 RPR S/N/AX/GEN/TRNK 2.5CM/<: CPT

## 2024-09-28 RX ORDER — LIDOCAINE HYDROCHLORIDE 10 MG/ML
5 INJECTION, SOLUTION EPIDURAL; INFILTRATION; INTRACAUDAL; PERINEURAL
Status: COMPLETED | OUTPATIENT
Start: 2024-09-28 | End: 2024-09-28

## 2024-09-28 RX ADMIN — LIDOCAINE HYDROCHLORIDE 50 MG: 10 SOLUTION INTRAVENOUS at 07:09

## 2024-09-29 NOTE — ED PROVIDER NOTES
Encounter Date: 2024       History     Chief Complaint   Patient presents with    Left index finger laceration     Cut left finger with knife while cutting corn. Has had tetanus within past five years.       Dayne Sanders is a 29 year old male with pmh anxiety, bipolar disorder, hallucinations, paranoid personality disorder, schizophrenia presenting to the ED with a laceration to the left index finger. He was using a knife to cut corn and accidentally struck his finger. Last tetanus May 2024.       Review of patient's allergies indicates:  No Known Allergies  Past Medical History:   Diagnosis Date    Addiction to drug     Allergy     Anxiety     Bipolar disorder     Depression     Fatigue     Hallucination     History of psychiatric hospitalization     Hx of psychiatric care     Mononucleosis 10/23/2008    positive monospot    Paranoid personality disorder     Psychiatric problem     Schizoaffective disorder     Schizophrenia     Sleep difficulties     Therapy      Past Surgical History:   Procedure Laterality Date    mole removeal       Family History   Problem Relation Name Age of Onset    Alcohol abuse Mother Edith     Bipolar disorder Mother Edith     No Known Problems Father Tramaine     Heart disease Brother Tramaine     Diabetes Maternal Grandmother      Heart disease Paternal Grandfather          in 30's    Hypertension Paternal Grandfather      Macular degeneration Paternal Grandfather       Social History     Tobacco Use    Smoking status: Former     Current packs/day: 0.00     Average packs/day: 1 pack/day for 2.0 years (2.0 ttl pk-yrs)     Types: Vaping with nicotine, Cigarettes     Start date:      Quit date:      Years since quittin.7     Passive exposure: Past    Smokeless tobacco: Never   Substance Use Topics    Alcohol use: Yes     Alcohol/week: 3.0 standard drinks of alcohol     Types: 3 Cans of beer per week    Drug use: Not Currently     Types: Marijuana, Cocaine, LSD      Review of Systems   Constitutional:  Negative for fever.   HENT:  Negative for sore throat.    Respiratory:  Negative for shortness of breath.    Cardiovascular:  Negative for chest pain.   Gastrointestinal:  Negative for nausea.   Genitourinary:  Negative for dysuria.   Musculoskeletal:  Negative for back pain.   Skin:  Positive for wound (left index finger). Negative for rash.   Neurological:  Negative for weakness.   Hematological:  Does not bruise/bleed easily.       Physical Exam     Initial Vitals [09/28/24 1848]   BP Pulse Resp Temp SpO2   (!) 143/104 90 18 98.2 °F (36.8 °C) 96 %      MAP       --         Physical Exam    Nursing note and vitals reviewed.  Constitutional: He appears well-developed and well-nourished. He is not diaphoretic. No distress.   HENT:   Head: Normocephalic and atraumatic. Mouth/Throat: Oropharynx is clear and moist.   Eyes: Conjunctivae are normal.   Neck: Neck supple.   Cardiovascular:  Normal rate, regular rhythm, normal heart sounds and intact distal pulses.     Exam reveals no gallop and no friction rub.       No murmur heard.  Pulmonary/Chest: Breath sounds normal. He has no wheezes. He has no rhonchi. He has no rales.   Abdominal: Abdomen is soft. He exhibits no distension. There is no abdominal tenderness.   Musculoskeletal:         General: Normal range of motion.      Cervical back: Neck supple.      Comments: Full ROM of all joints of the left index finger       Neurological: He is alert and oriented to person, place, and time.   Skin: Laceration (left index finger, 1.5 cm between DIP and PIP) noted. No rash noted. No erythema.         ED Course   Lac Repair    Date/Time: 9/28/2024 9:15 PM    Performed by: Marcelina Gaines NP  Authorized by: Yancy Villaseñor MD    Consent:     Consent obtained:  Verbal    Risks discussed:  Infection, need for additional repair, poor cosmetic result, poor wound healing, vascular damage, tendon damage, retained foreign body, nerve  damage and pain  Anesthesia:     Anesthesia method:  Local infiltration    Local anesthetic:  Lidocaine 1% w/o epi  Laceration details:     Location:  Finger    Finger location:  L index finger    Length (cm):  1.5  Pre-procedure details:     Preparation:  Patient was prepped and draped in usual sterile fashion  Exploration:     Wound exploration: wound explored through full range of motion    Treatment:     Area cleansed with:  Povidone-iodine    Amount of cleaning:  Standard    Irrigation solution:  Sterile water and tap water    Irrigation method:  Syringe and tap  Skin repair:     Repair method:  Sutures    Suture size:  4-0    Suture material:  Nylon    Suture technique:  Simple interrupted    Number of sutures:  4  Approximation:     Approximation:  Close  Repair type:     Repair type:  Simple  Post-procedure details:     Dressing:  Non-adherent dressing and splint for protection    Procedure completion:  Tolerated well, no immediate complications    Labs Reviewed - No data to display       Imaging Results    None          Medications   LIDOcaine (PF) 10 mg/ml (1%) injection 50 mg (50 mg Infiltration Given 9/28/24 1910)     Medical Decision Making  This is an urgent evaluation of a 29 year old male presenting to the ED with a laceration to the left index finger. He has full ROM of all joints of the digit. No obvious foreign body on exam. No obvious tendon injury. The wound was irrigated and then repaired by me without difficulty. He was placed in a metal finger splint for protection of the wound. Discussed wound care and return precautions and he verbalized understanding. Based on my clinical evaluation, I do not appreciate any immediate, emergent, or life threatening condition or etiology that warrants additional workup today and feel that the patient can be discharged with close follow up care.       Risk  Prescription drug management.                                      Clinical Impression:  Final  diagnoses:  [S61.211A] Laceration of left index finger without foreign body without damage to nail, initial encounter (Primary)          ED Disposition Condition    Discharge Stable          ED Prescriptions    None       Follow-up Information       Follow up With Specialties Details Why Contact Info Additional Information    [facility] - Emergency Dept Emergency Medicine In 10 days For suture removal [address] [id number]  [phone] 1st floor             Marcelina Gaines NP  09/28/24 2111